# Patient Record
Sex: MALE | Race: WHITE
[De-identification: names, ages, dates, MRNs, and addresses within clinical notes are randomized per-mention and may not be internally consistent; named-entity substitution may affect disease eponyms.]

---

## 2017-01-20 ENCOUNTER — HOSPITAL ENCOUNTER (OUTPATIENT)
Dept: HOSPITAL 45 - C.LAB1850 | Age: 52
Discharge: HOME | End: 2017-01-20
Attending: INTERNAL MEDICINE
Payer: COMMERCIAL

## 2017-01-20 DIAGNOSIS — E03.9: ICD-10-CM

## 2017-01-20 DIAGNOSIS — Z94.0: ICD-10-CM

## 2017-01-20 DIAGNOSIS — Q87.81: Primary | ICD-10-CM

## 2017-01-20 LAB
ANION GAP SERPL CALC-SCNC: 10 MMOL/L (ref 3–11)
BASOPHILS # BLD: 0.03 K/UL (ref 0–0.2)
BASOPHILS NFR BLD: 0.6 %
BUN SERPL-MCNC: 15 MG/DL (ref 7–18)
BUN/CREAT SERPL: 15.3 (ref 10–20)
CALCIUM SERPL-MCNC: 10.5 MG/DL (ref 8.5–10.1)
CHLORIDE SERPL-SCNC: 97 MMOL/L (ref 98–107)
CO2 SERPL-SCNC: 28 MMOL/L (ref 21–32)
COMPLETE: YES
CREAT SERPL-MCNC: 0.96 MG/DL (ref 0.6–1.4)
EOSINOPHIL NFR BLD AUTO: 210 K/UL (ref 130–400)
EST. AVERAGE GLUCOSE BLD GHB EST-MCNC: 88 MG/DL
GLUCOSE SERPL-MCNC: 97 MG/DL (ref 70–99)
HCT VFR BLD CALC: 42.5 % (ref 42–52)
IG%: 0.2 %
IMM GRANULOCYTES NFR BLD AUTO: 22.2 %
LYMPHOCYTES # BLD: 1.11 K/UL (ref 1.2–3.4)
MCH RBC QN AUTO: 32 PG (ref 25–34)
MCHC RBC AUTO-ENTMCNC: 36.9 G/DL (ref 32–36)
MCV RBC AUTO: 86.7 FL (ref 80–100)
MONOCYTES NFR BLD: 12.8 %
NEUTROPHILS # BLD AUTO: 8.6 %
NEUTROPHILS NFR BLD AUTO: 55.6 %
PMV BLD AUTO: 10.7 FL (ref 7.4–10.4)
POTASSIUM SERPL-SCNC: 3.6 MMOL/L (ref 3.5–5.1)
RBC # BLD AUTO: 4.9 M/UL (ref 4.7–6.1)
SODIUM SERPL-SCNC: 135 MMOL/L (ref 136–145)
WBC # BLD AUTO: 4.99 K/UL (ref 4.8–10.8)

## 2017-04-21 ENCOUNTER — HOSPITAL ENCOUNTER (OUTPATIENT)
Dept: HOSPITAL 45 - C.LAB1850 | Age: 52
Discharge: HOME | End: 2017-04-21
Attending: INTERNAL MEDICINE
Payer: COMMERCIAL

## 2017-04-21 DIAGNOSIS — Z79.899: ICD-10-CM

## 2017-04-21 DIAGNOSIS — Z94.0: ICD-10-CM

## 2017-04-21 DIAGNOSIS — R94.6: Primary | ICD-10-CM

## 2017-09-09 ENCOUNTER — HOSPITAL ENCOUNTER (INPATIENT)
Dept: HOSPITAL 45 - C.EDB | Age: 52
LOS: 4 days | Discharge: HOME | DRG: 377 | End: 2017-09-13
Attending: FAMILY MEDICINE | Admitting: HOSPITALIST
Payer: COMMERCIAL

## 2017-09-09 VITALS
HEART RATE: 74 BPM | OXYGEN SATURATION: 100 % | SYSTOLIC BLOOD PRESSURE: 152 MMHG | DIASTOLIC BLOOD PRESSURE: 81 MMHG | TEMPERATURE: 98.6 F

## 2017-09-09 VITALS
WEIGHT: 147.71 LBS | BODY MASS INDEX: 23.18 KG/M2 | HEIGHT: 67 IN | WEIGHT: 147.71 LBS | BODY MASS INDEX: 23.18 KG/M2 | HEIGHT: 67 IN

## 2017-09-09 VITALS
DIASTOLIC BLOOD PRESSURE: 79 MMHG | TEMPERATURE: 98.78 F | HEART RATE: 77 BPM | SYSTOLIC BLOOD PRESSURE: 129 MMHG | OXYGEN SATURATION: 100 %

## 2017-09-09 VITALS
HEART RATE: 74 BPM | DIASTOLIC BLOOD PRESSURE: 81 MMHG | SYSTOLIC BLOOD PRESSURE: 152 MMHG | TEMPERATURE: 98.6 F | OXYGEN SATURATION: 100 %

## 2017-09-09 VITALS
DIASTOLIC BLOOD PRESSURE: 79 MMHG | OXYGEN SATURATION: 100 % | SYSTOLIC BLOOD PRESSURE: 128 MMHG | HEART RATE: 82 BPM | TEMPERATURE: 98.42 F

## 2017-09-09 VITALS
OXYGEN SATURATION: 97 % | DIASTOLIC BLOOD PRESSURE: 82 MMHG | SYSTOLIC BLOOD PRESSURE: 146 MMHG | HEART RATE: 93 BPM | TEMPERATURE: 98.24 F

## 2017-09-09 VITALS
SYSTOLIC BLOOD PRESSURE: 121 MMHG | TEMPERATURE: 96.44 F | OXYGEN SATURATION: 99 % | DIASTOLIC BLOOD PRESSURE: 76 MMHG | HEART RATE: 78 BPM

## 2017-09-09 VITALS
OXYGEN SATURATION: 100 % | SYSTOLIC BLOOD PRESSURE: 126 MMHG | DIASTOLIC BLOOD PRESSURE: 71 MMHG | TEMPERATURE: 98.78 F | HEART RATE: 82 BPM

## 2017-09-09 DIAGNOSIS — N18.6: ICD-10-CM

## 2017-09-09 DIAGNOSIS — K22.70: ICD-10-CM

## 2017-09-09 DIAGNOSIS — Z94.0: ICD-10-CM

## 2017-09-09 DIAGNOSIS — T39.015A: ICD-10-CM

## 2017-09-09 DIAGNOSIS — E87.6: ICD-10-CM

## 2017-09-09 DIAGNOSIS — I24.8: ICD-10-CM

## 2017-09-09 DIAGNOSIS — E03.9: ICD-10-CM

## 2017-09-09 DIAGNOSIS — K57.30: ICD-10-CM

## 2017-09-09 DIAGNOSIS — I12.0: ICD-10-CM

## 2017-09-09 DIAGNOSIS — D62: ICD-10-CM

## 2017-09-09 DIAGNOSIS — E83.42: ICD-10-CM

## 2017-09-09 DIAGNOSIS — Z87.891: ICD-10-CM

## 2017-09-09 DIAGNOSIS — F32.9: ICD-10-CM

## 2017-09-09 DIAGNOSIS — M1A.9XX0: ICD-10-CM

## 2017-09-09 DIAGNOSIS — Z88.2: ICD-10-CM

## 2017-09-09 DIAGNOSIS — Q87.81: ICD-10-CM

## 2017-09-09 DIAGNOSIS — K92.2: Primary | ICD-10-CM
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
## 2017-09-09 LAB
ALP SERPL-CCNC: 53 U/L (ref 45–117)
ALT SERPL-CCNC: 30 U/L (ref 12–78)
ANION GAP SERPL CALC-SCNC: 12 MMOL/L (ref 3–11)
AST SERPL-CCNC: 25 U/L (ref 15–37)
BASOPHILS # BLD: 0.04 K/UL (ref 0–0.2)
BASOPHILS NFR BLD: 0.3 %
BUN SERPL-MCNC: 33 MG/DL (ref 7–18)
BUN/CREAT SERPL: 30.3 (ref 10–20)
CALCIUM SERPL-MCNC: 10.2 MG/DL (ref 8.5–10.1)
CHLORIDE SERPL-SCNC: 99 MMOL/L (ref 98–107)
CKMB/CK RATIO: 1 (ref 0–3)
CO2 SERPL-SCNC: 25 MMOL/L (ref 21–32)
COMPLETE: YES
CREAT CL PREDICTED SERPL C-G-VRATE: 72.6 ML/MIN
CREAT SERPL-MCNC: 1.1 MG/DL (ref 0.6–1.4)
EOSINOPHIL NFR BLD AUTO: 297 K/UL (ref 130–400)
GLUCOSE SERPL-MCNC: 127 MG/DL (ref 70–99)
HCT VFR BLD CALC: 21.3 % (ref 42–52)
IG%: 0.5 %
IMM GRANULOCYTES NFR BLD AUTO: 9.2 %
INR PPP: 1 (ref 0.9–1.1)
LYMPHOCYTES # BLD: 1.21 K/UL (ref 1.2–3.4)
MAGNESIUM SERPL-MCNC: 1.3 MG/DL (ref 1.8–2.4)
MCH RBC QN AUTO: 32.4 PG (ref 25–34)
MCHC RBC AUTO-ENTMCNC: 36.2 G/DL (ref 32–36)
MCV RBC AUTO: 89.5 FL (ref 80–100)
MONOCYTES NFR BLD: 7.1 %
NEUTROPHILS # BLD AUTO: 0.7 %
NEUTROPHILS NFR BLD AUTO: 82.2 %
NRBC BLD AUTO-RTO: 0.7 %
PARTIAL THROMBOPLASTIN RATIO: 0.8
PMV BLD AUTO: 10.7 FL (ref 7.4–10.4)
POLYCHROMASIA BLD QL SMEAR: (no result)
POTASSIUM SERPL-SCNC: 2.2 MMOL/L (ref 3.5–5.1)
PROTHROMBIN TIME: 11.1 SECONDS (ref 9–12)
RBC # BLD AUTO: 2.38 M/UL (ref 4.7–6.1)
SODIUM SERPL-SCNC: 136 MMOL/L (ref 136–145)
TIBC SERPL-MCNC: 349 MCG/DL (ref 250–450)
WBC # BLD AUTO: 13.17 K/UL (ref 4.8–10.8)

## 2017-09-09 RX ADMIN — POTASSIUM CHLORIDE SCH MLS/HR: 10 INJECTION, SOLUTION INTRAVENOUS at 20:00

## 2017-09-09 RX ADMIN — POTASSIUM CHLORIDE SCH MLS/HR: 10 INJECTION, SOLUTION INTRAVENOUS at 17:00

## 2017-09-09 RX ADMIN — IPRATROPIUM BROMIDE AND ALBUTEROL SULFATE SCH ML: .5; 3 SOLUTION RESPIRATORY (INHALATION) at 16:00

## 2017-09-09 RX ADMIN — METOPROLOL TARTRATE SCH MG: 5 INJECTION, SOLUTION INTRAVENOUS at 18:04

## 2017-09-09 RX ADMIN — DEXTROSE MONOHYDRATE, SODIUM CHLORIDE, AND POTASSIUM CHLORIDE SCH MLS/HR: 50; 9; 1.49 INJECTION, SOLUTION INTRAVENOUS at 18:03

## 2017-09-09 RX ADMIN — IPRATROPIUM BROMIDE AND ALBUTEROL SULFATE SCH ML: .5; 3 SOLUTION RESPIRATORY (INHALATION) at 20:00

## 2017-09-09 RX ADMIN — CLONIDINE HYDROCHLORIDE SCH MG: 0.1 TABLET ORAL at 20:20

## 2017-09-09 RX ADMIN — MYCOPHENOLIC ACID SCH MG: 180 TABLET, DELAYED RELEASE ORAL at 20:20

## 2017-09-09 RX ADMIN — PANTOPRAZOLE SCH MG: 40 TABLET, DELAYED RELEASE ORAL at 20:21

## 2017-09-09 RX ADMIN — Medication SCH MG: at 20:20

## 2017-09-09 RX ADMIN — TACROLIMUS SCH MG: 0.5 CAPSULE ORAL at 20:20

## 2017-09-09 RX ADMIN — POTASSIUM CHLORIDE SCH MLS/HR: 10 INJECTION, SOLUTION INTRAVENOUS at 20:19

## 2017-09-09 RX ADMIN — METHYLPREDNISOLONE SODIUM SUCCINATE SCH MLS/MIN: 1 INJECTION, POWDER, FOR SOLUTION INTRAMUSCULAR; INTRAVENOUS at 20:21

## 2017-09-09 NOTE — DIAGNOSTIC IMAGING REPORT
CHEST ONE VIEW PORTABLE



CLINICAL HISTORY: SOB dyspnea



COMPARISON STUDY:  No previous studies for comparison.



FINDINGS: The bones soft tissues and hemidiaphragms are normal. The

cardiomediastinal silhouette is normal. The lungs are clear. The pulmonary

vasculature is normal. 



IMPRESSION:  Negative chest. 











The above report was generated using voice recognition software.  It may contain

grammatical, syntax or spelling errors.









Electronically signed by:  Zelalem Bowers M.D.

9/9/2017 12:45 PM



Dictated Date/Time:  9/9/2017 12:45 PM

## 2017-09-09 NOTE — EMERGENCY ROOM VISIT NOTE
History


Report prepared by Nahomy:  Natasha Grissom


Under the Supervision of:  Dr. Maryann Gupta M.D.


First contact with patient:  12:14


Chief Complaint:  SHORTNESS OF BREATH


Stated Complaint:  CHILLS,COUGH,WHEEZING,DIZZY,SOB


Nursing Triage Summary:  


patient states he has had a cough, sob, not feeling well for the past week. I 


was placed on doxycycline on tuesday





History of Present Illness


The patient is a 52 year old male who presents to the Emergency Room with 

complaints of constant shortness of breath beginning last week. The patient 

notes that he has a cough, vomiting, dizziness, and darkened stool. The patient 

saw his PCP 5 days ago and was put on doxycycline. He notes that he continued 

to feel ill after starting the antibiotic and was not able to go to work. The 

patient has a history of kidney transplant and gout. The patient is not 

currently on dialysis.





   Source of History:  patient


   Onset:  last week


   Timing:  constant


   Associated Symptoms:  + cough, + vomiting


Note:


Pt notes dizziness and darkened stool.





Review of Systems


See HPI for pertinent positives & negatives. A total of 10 systems reviewed and 

were otherwise negative.





Past Medical & Surgical


Medical Problems:


(1) Symptomatic anemia








Social History


Smoking Status:  Former Smoker


Marital Status:  


Housing Status:  lives with family


Occupation Status:  employed





Current/Historical Medications


Scheduled


Allopurinol (Allopurinol), 300 MG PO DAILY


Amiloride Hcl (Amiloride Hcl), 5 MG PO DAILY


Amlodipine (Norvasc), 5 MG PO DAILY


Aspirin (Aspirin Ec), 81 MG PO DAILY


Clonidine HCl (Clonidine HCl), 0.2 MG PO BID


Doxycycline (Monohydrate) (Doxycycline), 100 MG PO BID


Fish Oil (Omega-3), 1 CAP PO BID


Guaifenesin Ext Rel (Mucinex Ext Rel), 1,200 MG PO Q12


Hydralazine Hcl (Apresoline), 10 MG PO TID


Liothyronine Sodium (Liothyronine Sodium), 25 MCG PO DAILY


Magnesium Oxide (Mg Supplement (Magnesium), 500 MG PO BID


Metolazone (Zaroxolyn), 2.5 MG PO DAILY


Metoprolol Tartrate (Lopressor) (Lopressor), 100 MG PO BID


Multivitamin (Multivitamin), 1 TAB PO DAILY


Mycophenolate Sodium (Myfortic), 360 MG PO BID


Sertraline (Zoloft), 200 MG PO DAILY


Tacrolimus (Prograf), 0.5 MG PO QPM


Tacrolimus (Prograf), 1 MG PO QAM





Allergies


Coded Allergies:  


     Sulfa Drugs (Verified  Allergy, Mild, RASH, 9/9/17)





Physical Exam


Vital Signs











  Date Time  Temp Pulse Resp B/P (MAP) Pulse Ox O2 Delivery O2 Flow Rate FiO2


 


9/9/17 14:00  76 24 104/74 100   


 


9/9/17 13:00  84 24 148/78 97   


 


9/9/17 12:53  75      


 


9/9/17 12:16     98 Room Air  


 


9/9/17 12:01 36.8 100 18 112/73 97 Room Air  











Physical Exam


Vital signs reviewed.





General: Chronically ill-appearing male, in no significant distress. On nasal 

cannula oxygen. 





HEENT: No scleral icterus, PERRLA, neck supple.  Atraumatic.





Cardiovascular: Regular rate and rhythm, no extra sounds.





Pulmonary: Clear to auscultation bilaterally, normal work of breathing.





Abdomen: Soft, nontender, nondistended, positive bowel sounds.





Musculoskeletal: Atraumatic, no peripheral edema. Fistula to right upper 

extremity.





Neurologic: Patient awake alert and oriented x 3





Skin: Warm, dry, no rash





Rectal: Deferred per patient's request to nursing staff.  Guaiac positive stool.





Medical Decision & Procedures


ER Provider


Diagnostic Interpretation:


Radiology results as stated below per my review and radiologist interpretation:





CHEST ONE VIEW PORTABLE








FINDINGS: The bones soft tissues and hemidiaphragms are normal. The


cardiomediastinal silhouette is normal. The lungs are clear. The pulmonary


vasculature is normal. 





IMPRESSION:  Negative chest. 


The above report was generated using voice recognition software.  It may contain


grammatical, syntax or spelling errors.


Electronically signed by:  Zelalem Bowers M.D.





Laboratory Results


9/9/17 12:20








Red Blood Count 2.38, Mean Corpuscular Volume 89.5, Mean Corpuscular Hemoglobin 

32.4, Mean Corpuscular Hemoglobin Concent 36.2, Mean Platelet Volume 10.7, 

Neutrophils (%) (Auto) 82.2, Lymphocytes (%) (Auto) 9.2, Monocytes (%) (Auto) 

7.1, Eosinophils (%) (Auto) 0.7, Basophils (%) (Auto) 0.3, Neutrophils # (Auto) 

10.83, Lymphocytes # (Auto) 1.21, Monocytes # (Auto) 0.93, Eosinophils # (Auto) 

0.09, Basophils # (Auto) 0.04





9/9/17 12:20

















Test


  9/9/17


12:20 9/9/17


12:47


 


White Blood Count


  13.17 K/uL


(4.8-10.8) 


 


 


Red Blood Count


  2.38 M/uL


(4.7-6.1) 


 


 


Hemoglobin


  7.7 g/dL


(14.0-18.0) 


 


 


Hematocrit 21.3 % (42-52)  


 


Mean Corpuscular Volume


  89.5 fL


() 


 


 


Mean Corpuscular Hemoglobin


  32.4 pg


(25-34) 


 


 


Mean Corpuscular Hemoglobin


Concent 36.2 g/dl


(32-36) 


 


 


Platelet Count


  297 K/uL


(130-400) 


 


 


Mean Platelet Volume


  10.7 fL


(7.4-10.4) 


 


 


Neutrophils (%) (Auto) 82.2 %  


 


Lymphocytes (%) (Auto) 9.2 %  


 


Monocytes (%) (Auto) 7.1 %  


 


Eosinophils (%) (Auto) 0.7 %  


 


Basophils (%) (Auto) 0.3 %  


 


Neutrophils # (Auto)


  10.83 K/uL


(1.4-6.5) 


 


 


Lymphocytes # (Auto)


  1.21 K/uL


(1.2-3.4) 


 


 


Monocytes # (Auto)


  0.93 K/uL


(0.11-0.59) 


 


 


Eosinophils # (Auto)


  0.09 K/uL


(0-0.5) 


 


 


Basophils # (Auto)


  0.04 K/uL


(0-0.2) 


 


 


RDW Standard Deviation


  44.9 fL


(36.4-46.3) 


 


 


RDW Coefficient of Variation


  16.9 %


(11.5-14.5) 


 


 


Immature Granulocyte % (Auto) 0.5 %  


 


Immature Granulocyte # (Auto)


  0.07 K/uL


(0.00-0.02) 


 


 


Nucleated RBC Absolute Count


(auto) 0.09 K/uL


(0-0) 


 


 


Nucleated Red Blood Cells % 0.7 %  


 


Polychromasia 1+  


 


Anion Gap


  12.0 mmol/L


(3-11) 


 


 


Est Creatinine Clear Calc


Drug Dose 72.6 ml/min 


  


 


 


Estimated GFR (


American) 88.4 


  


 


 


Estimated GFR (Non-


American 76.2 


  


 


 


BUN/Creatinine Ratio 30.3 (10-20)  


 


Calcium Level


  10.2 mg/dl


(8.5-10.1) 


 


 


Magnesium Level


  1.3 mg/dl


(1.8-2.4) 


 


 


Iron Level


  61 mcg/dl


() 


 


 


Total Iron Binding Capacity


  349 mcg/dl


(250-450) 


 


 


Total Bilirubin


  0.6 mg/dl


(0.2-1) 


 


 


Direct Bilirubin


  0.1 mg/dl


(0-0.2) 


 


 


Aspartate Amino Transf


(AST/SGOT) 25 U/L (15-37) 


  


 


 


Alanine Aminotransferase


(ALT/SGPT) 30 U/L (12-78) 


  


 


 


Alkaline Phosphatase


  53 U/L


() 


 


 


Total Creatine Kinase


  147 U/L


() 


 


 


Creatine Kinase MB


  1.5 ng/ml


(0.5-3.6) 


 


 


Creatine Kinase MB Ratio 1.0 (0-3.0)  


 


Total Protein


  6.8 gm/dl


(6.4-8.2) 


 


 


Albumin


  3.4 gm/dl


(3.4-5.0) 


 


 


Bedside Troponin I


  


  < 0.030 ng/ml


(0-0.045)





Laboratory results per my review.





Medications Administered











 Medications


  (Trade)  Dose


 Ordered  Sig/Juanita


 Route  Start Time


 Stop Time Status Last Admin


Dose Admin


 


 Albuterol/


 Ipratropium


  (Duoneb)  3 ml  NOW  STAT


 INH  9/9/17 12:18


 9/9/17 12:24 DC 9/9/17 12:35


3 ML


 


 Methylprednisolone


 Sodium Succinate


  (Solu-Medrol IV)  125 mg  NOW  STAT


 IV  9/9/17 12:34


 9/9/17 12:35 DC 9/9/17 12:36


125 MG


 


 Potassium Chloride


  (Kcl 10 Meq /


 Wtr)  20 meq  NOW  STAT


 IV  9/9/17 13:14


 9/9/17 13:15 DC 9/9/17 13:52


20 MEQ


 


 Magnesium Sulfate


  (Magnesium


 Sulfate)  2 gm  NOW  STAT


 IV  9/9/17 13:14


 9/9/17 13:15 DC 9/9/17 13:52


2 GM











ECG


Indication:  SOB/dyspnea


Rate (beats per minute):  81


Rhythm:  normal sinus


Findings:  no acute ischemic change, no ectopy, other (poor quality baseline 

for interpretation)





ED Course


1217: Past medical records reviewed. The patient was evaluated in room A3. A 

complete history and physical examination was performed. 





1218: Duoneb 3 ml INH.





1234: Solu-Medrol  mg IV.





1314: Magnesium Sulfate 2 gm IV, Potassium Chloride 20 meq IV.





1350: I talked to the  about admissions. 





1424: I reviewed the patient's case with .  He will evaluate the 

patient for further management.





Medical Decision





Differential diagnosis:


Etiologies such as metabolic, infection, hypo/hyperglycemia, electrolyte 

abnormalities, cardiac sources, intracerebral event, toxicologic, neurologic, 

as well as others were entertained. 





This patient was evaluated and appeared to be in no significant distress.  IV 

access was obtained and laboratory work was drawn.  The patient was placed on 

the cardiac monitor and found to be in a normal sinus rhythm.  EKG reveals some 

nonspecific T-wave abnormalities.  Chest x-ray was obtained and is largely 

clear.  He was given a DuoNeb treatment for his wheezing.  IV Solu-Medrol was 

administered.  The patient did have resolution of his shortness of breath.  

Laboratory work reveals H&H of 7.7/21.  Patient also has potassium of 2.2 and a 

magnesium of 1.3.  Stool is guaiac positive per nursing staff.  Patient was 

typed and crossed for 1 unit of PRBCs.  He was consented for transfusion.  20 

and make use of IV potassium and 2 g of IV magnesium were ordered.  The patient 

has no significant complaints at this time.  Patient was discussed with the 

hospitalist service for admission and further management.





Medication Reconcilliation


Current Medication List:  was personally reviewed by me





Blood Pressure Screening


Patient's blood pressure:  Normal blood pressure





Consults


Time Called:  1420


Consulting Physician:  Dr. Marco STUART


Returned Call:  1424


Discussed the patient's case. He will be evaluated for further management.





Impression





 Primary Impression:  


 GI bleed


 Additional Impressions:  


 Hypokalemia


 Hypomagnesemia


 Anemia





Scribe Attestation


The scribe's documentation has been prepared under my direction and personally 

reviewed by me in its entirety. I confirm that the note above accurately 

reflects all work, treatment, procedures, and medical decision making performed 

by me.





Departure Information


Dispostion


Being Evaluated By Hospitalist





Referrals


Gray Monahan M.D. (PCP)





Patient Instructions


My [facility]





Problem Qualifiers

## 2017-09-09 NOTE — DIAGNOSTIC IMAGING REPORT
(CHEST) THORAX WITHOUT



CT DOSE: 273.63 mGy.cm



HISTORY: Dyspnea  shortness of breath



TECHNIQUE: Multiaxial CT images of the chest were performed without contrast.  A

dose lowering technique was utilized adhering to the principles of ALARA.





COMPARISON: None.



FINDINGS: The lungs are clear. The mediastinal vascular structures are within

normal limits. No mediastinal or hilar lymphadenopathy. No pleural effusion or

pneumothorax. Limited views of the upper abdomen demonstrate a normal liver and

spleen. Kidneys are heavily calcified



IMPRESSION: 

No acute process. 









The above report was generated using voice recognition software.  It may contain

grammatical, syntax or spelling errors.







Electronically signed by:  Zelalem Bowers M.D.

9/9/2017 4:05 PM



Dictated Date/Time:  9/9/2017 4:03 PM

## 2017-09-09 NOTE — HISTORY AND PHYSICAL
History & Physical


Date & Time of Service:


Sep 9, 2017 at 15:07


Chief Complaint:


Chills,Cough,Wheezing,Dizzy,Sob


Primary Care Physician:


Gray Monahan M.D.


History of Present Illness


Source:  patient, spouse, clinic records, hospital records


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 

renal transplant x 3, HTN, hypothyroidism, depression and gout who presented to 

the ED on 9/9 with shortness of breath, non-productive cough, dizziness and 

melena.  The patient states that his symptoms first began with a non-productive 

cough about one week ago.  He developed associated shortness of breath with 

severe coughing fits and at times vomiting after coughing.  Over the last week 

his shortness of breath has become progressive and accompanied by dyspnea on 

minimal exertion and wheezing. The patient saw his PCP on September 5th and was 

prescribed doxycycline for a presumed respiratory infection.  The patient has 

not felt any better since starting the antibiotic.  Around that time, the 

patient also started to note melena.  He denies any rajiv blood.  This morning 

PTA the patient developed some chills.  He complains of generalized weakness 

and fatigue.  He has some dizziness associated with exertion and severe 

coughing fits.  The patient denies fevers, sweats, chest pain, palpitations, 

claudication, nausea, abdominal pain, dysuria, hematuria, urinary retention, 

paralysis, motor weakness, numbness and tingling.





Past Medical/Surgical History


Alport syndrome





ESRD s/p renal transplant x 3.  Currently 1 working transplant kidney right side





HTN





Hypothyroidism





Depression





Gout





Family History


No known family medical history.  Patient is adopted.





Social History


Smoking Status:  Current Some Day Smoker


Smokeless Tobacco Use:  No


Alcohol Use:  none


Drug Use:  none


Marital Status:  


Housing status:  lives with significant other


Occupational Status:  employed





Multi-Drug Resistant Organisms


History of MDRO:  No





Allergies


Coded Allergies:  


     Sulfa Drugs (Verified  Allergy, Mild, RASH, 9/9/17)





Home Medications


Scheduled


Allopurinol (Allopurinol), 300 MG PO DAILY


Amiloride Hcl (Amiloride Hcl), 5 MG PO DAILY


Amlodipine (Norvasc), 5 MG PO DAILY


Aspirin (Aspirin Ec), 81 MG PO DAILY


Clonidine HCl (Clonidine HCl), 0.2 MG PO BID


Doxycycline (Monohydrate) (Doxycycline), 100 MG PO BID


Fish Oil (Omega-3), 1 CAP PO BID


Guaifenesin Ext Rel (Mucinex Ext Rel), 1,200 MG PO Q12


Hydralazine Hcl (Apresoline), 10 MG PO TID


Liothyronine Sodium (Liothyronine Sodium), 25 MCG PO DAILY


Magnesium Oxide (Mg Supplement (Magnesium), 500 MG PO BID


Metolazone (Zaroxolyn), 2.5 MG PO DAILY


Metoprolol Tartrate (Lopressor) (Lopressor), 100 MG PO BID


Multivitamin (Multivitamin), 1 TAB PO DAILY


Mycophenolate Sodium (Myfortic), 360 MG PO BID


Sertraline (Zoloft), 200 MG PO DAILY


Tacrolimus (Prograf), 0.5 MG PO QPM


Tacrolimus (Prograf), 1 MG PO QAM





Review of Systems


Constitutional:  + chills, + sweats, + weakness, + fatigue, No fever


Eyes:  No worsening of vision, No eye pain, No diplopia


ENT:  No hearing loss, No sore throat, No trouble swallowing


Respiratory:  + cough, + wheezing, + shortness of breath, + dyspnea on exertion

, No sputum


Cardiovascular:  No chest pain, No claudication, No palpitations


Abdomen:  + vomiting (posttussive), + GI bleeding (melena), No pain, No nausea


Musculoskeletal:  No joint pain, No muscle pain, No calf pain


Genitourinary - Male:  No hematuria, No dysuria, No urinary retention


Neurologic:  No paralysis, No weakness, No numbness/tingling


Integumentary:  No rash, No itch, No color change





Physical Exam


Vital Signs











  Date Time  Temp Pulse Resp B/P (MAP) Pulse Ox O2 Delivery O2 Flow Rate FiO2


 


9/9/17 14:00  76 24 104/74 100   


 


9/9/17 13:00  84 24 148/78 97   


 


9/9/17 12:53  75      


 


9/9/17 12:16     98 Room Air  


 


9/9/17 12:01 36.8 100 18 112/73 97 Room Air  








General appearance:  +Appears chronically ill.  Well-developed, well-nourished, 

no apparent distress


Head:  Normocephalic, atraumatic


Eyes:  Normal inspection, PERRL, EOMI


ENT:  Normal ENT inspection, hearing grossly normal, pharynx normal


Neck:  Supple, no JVD, trachea midline


Respiratory/Chest:  +Wheezing in bases, diminished breath sounds throughout.  

No respiratory distress


Cardiovascular:  +Systolic murmur.  Regular rate & rhythm, no gallop


Abdomen/GI:  Normal bowel sounds, non-tender, soft


Extremities/Musculoskeletal:  Normal inspection, no calf tenderness, no pedal 

edema


Neurological/Psych:  Alert, normal mood/affect, oriented x 3


Skin:  Normal color, warm/dry, no rash





Diagnostics


Laboratory Results





Results Past 24 Hours








Test


  9/9/17


12:20 9/9/17


12:47 Range/Units


 


 


White Blood Count 13.17  4.8-10.8  K/uL


 


Red Blood Count 2.38  4.7-6.1  M/uL


 


Hemoglobin 7.7  14.0-18.0  g/dL


 


Hematocrit 21.3  42-52  %


 


Mean Corpuscular Volume 89.5    fL


 


Mean Corpuscular Hemoglobin 32.4  25-34  pg


 


Mean Corpuscular Hemoglobin


Concent 36.2


  


  32-36  g/dl


 


 


Platelet Count 297  130-400  K/uL


 


Mean Platelet Volume 10.7  7.4-10.4  fL


 


Neutrophils (%) (Auto) 82.2   %


 


Lymphocytes (%) (Auto) 9.2   %


 


Monocytes (%) (Auto) 7.1   %


 


Eosinophils (%) (Auto) 0.7   %


 


Basophils (%) (Auto) 0.3   %


 


Neutrophils # (Auto) 10.83  1.4-6.5  K/uL


 


Lymphocytes # (Auto) 1.21  1.2-3.4  K/uL


 


Monocytes # (Auto) 0.93  0.11-0.59  K/uL


 


Eosinophils # (Auto) 0.09  0-0.5  K/uL


 


Basophils # (Auto) 0.04  0-0.2  K/uL


 


RDW Standard Deviation 44.9  36.4-46.3  fL


 


RDW Coefficient of Variation 16.9  11.5-14.5  %


 


Immature Granulocyte % (Auto) 0.5   %


 


Immature Granulocyte # (Auto) 0.07  0.00-0.02  K/uL


 


Nucleated RBC Absolute Count


(auto) 0.09


  


  0-0  K/uL


 


 


Nucleated Red Blood Cells % 0.7   %


 


Polychromasia 1+   


 


Sodium Level 136  136-145  mmol/L


 


Potassium Level 2.2  3.5-5.1  mmol/L


 


Chloride Level 99    mmol/L


 


Carbon Dioxide Level 25  21-32  mmol/L


 


Anion Gap 12.0  3-11  mmol/L


 


Blood Urea Nitrogen 33  7-18  mg/dl


 


Creatinine


  1.10


  


  0.60-1.40


mg/dl


 


Est Creatinine Clear Calc


Drug Dose 72.6


  


   ml/min


 


 


Estimated GFR (


American) 88.4


  


   


 


 


Estimated GFR (Non-


American 76.2


  


   


 


 


BUN/Creatinine Ratio 30.3  10-20  


 


Random Glucose 127  70-99  mg/dl


 


Calcium Level 10.2  8.5-10.1  mg/dl


 


Magnesium Level 1.3  1.8-2.4  mg/dl


 


Total Bilirubin 0.6  0.2-1  mg/dl


 


Direct Bilirubin 0.1  0-0.2  mg/dl


 


Aspartate Amino Transf


(AST/SGOT) 25


  


  15-37  U/L


 


 


Alanine Aminotransferase


(ALT/SGPT) 30


  


  12-78  U/L


 


 


Alkaline Phosphatase 53    U/L


 


Total Creatine Kinase 147    U/L


 


Creatine Kinase MB 1.5  0.5-3.6  ng/ml


 


Creatine Kinase MB Ratio 1.0  0-3.0  


 


Total Protein 6.8  6.4-8.2  gm/dl


 


Albumin 3.4  3.4-5.0  gm/dl


 


Bedside Troponin I  < 0.030 0-0.045  ng/ml











Diagnostic Radiology


Reviewed the following studies and agree with interpretation as follows:





                                                                               

                                                                 


Patient Name: MARY LOPEZ                               Unit Number:  

[id number]                  


 





 











Dictated: 09/09/17 1245


 


Transcribed: 09/09/17 1245


 


MS


 


Printed Date/Time: [~ rep prt dt]/[~ rep prt tm]








 [~ rep ct labl] - [~ rep ct ivnm]


 





   [facility]


 Radiology Department


 [place] [address] [phone]





 











Dictated: 09/09/17 1245


 


Transcribed: 09/09/17 1245


 


MS


 


Printed Date/Time: [~ rep prt dt]/[~ rep prt tm]








 [~ rep ct labl] - [~ rep ct ivnm]


 





 











Patient:  MARY LOPEZ Address1:  [address] [facility] Rec:  [id number] Address2:  


 


Acct ID:  [id number] [facility] Zip:  [place] [address]


 


YOB: 1965          Sex:  M Room/Bed:  


 


Ref Phy:  Gray Monahan M.D. SC: LETI


 


Att Phy:   Report #:  [id number]


 


Safia Phy:  Gray Monahan M.D. Test:  CXR1P


 


Admit Phy:   Technician:  PAYAM


 


Interpreting Phy:  Zelalem Bowers M.D. Diagnosis:  CHILLS,COUGH,WHEEZING,DIZZY,

SOB


 


Ordering Phy:  Maryann Gupta M.D. Service Date:  09/09/17


 


Admit Date: 09/09/17 MNE: [id number]


 


 CONF:


 


 DICTATED BY: Zelalem Bowers M.D.]]


 


CC: Gray Monahan M.D. Flickinger, Bridget B., M.D.


 


 Endcc:











[~ rep ct add3]]


CHEST ONE VIEW PORTABLE





CLINICAL HISTORY: SOB dyspnea





COMPARISON STUDY:  No previous studies for comparison.





FINDINGS: The bones soft tissues and hemidiaphragms are normal. The


cardiomediastinal silhouette is normal. The lungs are clear. The pulmonary


vasculature is normal. 





IMPRESSION:  Negative chest. 

















The above report was generated using voice recognition software.  It may contain


grammatical, syntax or spelling errors.














Electronically signed by:  Zelalem Bowers M.D.


9/9/2017 12:45 PM





Dictated Date/Time:  9/9/2017 12:45 PM





 The status of this report is Signed.   


 Draft = Not yet reviewed or approved by Radiologist.  


 Signed = Reviewed and approved by Radiologist.


<AttendingPhy></AttendingPhy> <FamilyPhy>Gray Monahan M.D.</FamilyPhy> <

PrimaryPhy>Gray Monahan M.D.</PrimaryPhy> <UnitNumber>[id number]</UnitNumber

> <VisitNumber>[id number]</VisitNumber> <PatientName>MARY LOPEZ</

PatientName> <DateOfBirth>1965</DateOfBirth> <Location>[facility]</Location> <

ServiceDate>09/09/17</ServiceDate> <MNE>ESINDI</MNE> <OrderingPhy>Maryann Gupta M.D.</OrderingPhy> <OrderingPhyMNE>f rep ord dr mne</OrderingPhyMNE> <

DictatingPhyMNE>f rep dict dr webb</DictatingPhyMNE> <CCListMNE>f rep ct jon</

CCListMNE> <AdmittingPhyMNE>f pt admit dr webb</AdmittingPhyMNE> <AttendingPhyMNE

>f pt attend dr webb</AttendingPhyMNE>


<ConsultingPhyMNE>f pt consult dr webb</ConsultingPhyMNE> <FamilyPhyMNE>f pt fam 

dr webb</FamilyPhyMNE> <OtherPhyMNE>f pt other dr webb</OtherPhyMNE> <

PrimaryPhyMNE>f pt prim care dr webb</PrimaryPhyMNE> <ReferringPhyMNE>f pt 

referring dr webb</ReferringPhyMNE>





EKG


Reviewed EKG and agree with interpretation as follows:





81 bpm, NSR





Impression


Assessment and Plan


51 y/o male with a history of ESRD secondary to Alport syndrome s/p renal 

transplant x 3, HTN, hypothyroidism, depression and gout who presented to the 

ED on 9/9 with shortness of breath, non-productive cough, dizziness and melena.

  Patient afebrile, VSS on arrival.  CXR negative for acute disease.  WBC 

elevated 13.17.  Hgb 7.7.  Potassium 2.2.  Magnesium 1.3.  Cardiac enzymes 

negative.  EKG no ischemic changes.  





Acute blood loss anemia secondary to GI bleed--Hgb 7.7 on admission, baseline 15


-Admit to telemetry


-1 unit PRBC ordered to transfuse per ED


-Check H&h q4


-Iron and TIBC pending


-GI consulted, appreciate recs


-Protonix drip


-NPO except meds.  Home meds converted to IV where able


-Hold aspirin and fish oil


-D5NSS + 20 mEq KCl at 100 cc/hr.  Hold during transfusion unless hypotensive





?Bronchitis vs COPD exacerbation--pt denies h/o COPD, no history noted in 

outpatient records or h/o inhalers.  CXR per my interpretation appears 

emphysematous


-CT chest w/o contrast for further evaluation


-Pt completed 5 days doxycycline outpatient, will hold off further abx for now


-DuoNebs QIDR and q2h prn SOB/wheezing


-Solu-Medrol 40 mg IV TID.  Pt received 125 mg loading dose in ED





Hypokalemia, hypomagnesemia--h/o hypomagnesemia.  Upon review of lab history, 

magnesium typically around 1.5


-Potassium 2.2 on admission, magnesium 1.3.  Given 20 mEQ KCl and 2 mg 

magnesium in ED


-KCl 20 mEq IV q2h x 3, continue to monitor





ESRD secondary to Alport syndrome, s/p renal transplant x 3--stable


-Baseline creatinine around 1, creatinine 1.1 on admission


-Continue Myfortic 360 mg PO BID and tacrolimus 1 mg PO qam and 0.5 mg PO qpm





HTN--stable


-Lopressor 5 mg IV q6h, hydralazine 5 mg IV q6h, clonidine 0.2 mg PO BID and 

Norvasc 5 mg PO qd


-Hold amiloride and metolazone for now


-Bumex 0.5 mg PO qd prn fluid overload





Hypothyroidism


-Synthroid 12.5 mcg IV qd





Depression


-Continue Zoloft 200 mg PO qd





Gout


-Allopurinol held





DVT prophylaxis


-Hold chemical prophylaxis due bleeding


-SCDs





Code Status


-Level I, FULL RESUSCITATION STATUS





51 y/o M Hx ESRD post renal transplant x 3 - Alports syndrome - pt presenting 

with weakness and SOB - Hb found to be 7.4 


Describes dark stool and GERD - has Hx of bleeding ulcer following surgery 


Had been taking Doxy x 5 days for cough and SOB prior to arrival - no 

improvement





OE


Pale, thin male - no distress


S1,2 R


wheezing present in most lung fields


NT, ND


No CCE





P:


Work for anemia - active bleed - GI consult - transfusions started in ER


We suspect element of COPD due to imaging results, smoking Hx  - treating with 

Nebs, steroids, 02 - CT chest to ro PNM pending due to his immune status


Cont Immune meds for transplant Hx 


HTN meds, synthroid converted to IV





Level of Care


Telemetry





Resuscitation Status


FULL RESUSCITATION





VTE Prophylaxis


VTE Risk Assessment Done? Y/N:  Yes


Risk Level:  Moderate


Given or contraindicated:  SCD's

## 2017-09-09 NOTE — GASTROINTESTINAL CONSULTATION
Gastrointestinal Consultation


Date of Consultation:  Sep 9, 2017


Attending Physician:  JASON Whiet


Consulting Physician:  Wilfredo Silvestre


Reason for Consultation:  GI bleeding


History of Present Illness


Friend Susana with patient for h and P.  Patient is a 52 year old male [facility] chief 

complaint  of weakness. He is on daily ASA to help thin blood but denies MI,CVA

, or stents. He is s/p renal transplant. Over the last week has had cough and 

SOB and Doxycline did not help. Also over the last week looser and black 

stools. No stools for 2 days, however. Also fatigue, weakness. Vomiting post 

coughing spells mostly but this am spontaneous vomiting. Never any blood or 

black in vomitus.  No abd pain. No dysphagia.  States he had EGD in 1986 and 

noted to have DU.  States 2 colonscopies in past most recent over 5 years ago. 

No change in weight.  Hgb on admit 7.7 versus 15.9 on 4/21/17. CMP K 2.2. CXR 

and Chest CT negative.





Past Medical/Surgical History


Medical Problems:


(1) Anemia


Status: Acute  





(2) GI bleed


Status: Acute  





(3) Hypokalemia


Status: Acute  





(4) Hypomagnesemia


Status: Acute  











Family History


non contributory





Social History


Smoking Status:  Current Some Day Smoker


Drug Use:  none


Marital Status:  


Occupation Status:  employed





Allergies


Coded Allergies:  


     Sulfa Drugs (Verified  Allergy, Mild, RASH, 9/9/17)





Current Medications





Home Meds and Scripts








 Medications  Dose


 Route/Sig


 Max Daily Dose Days Date Category


 


 Mucinex Ext Rel


  (Guaifenesin) 600


 Mg Tab  1,200 Mg


 PO Q12


    9/9/17 Reported


 


 Aspirin Ec


  (Aspirin) 81 Mg


 Tab  81 Mg


 PO DAILY


    9/9/17 Reported


 


 Multivitamin


  (Multivitamins)


 Tab  1 Tab


 PO DAILY


    9/9/17 Reported


 


 Magnesium


  (Magnesium Oxide


  (Mg Supplement)


 500 Mg Tab  500 Mg


 PO BID


    9/9/17 Reported


 


 Omega-3 (Fish


 Oil) 1 Ea Cap  1 Cap


 PO BID


    9/9/17 Reported


 


 Doxycycline


  (Doxycycline


  (Monohydrate))


 100 Mg Cap  100 Mg


 PO BID


    9/9/17 Reported


 


 Allopurinol 300


 Mg Tab  300 Mg


 PO DAILY


    9/9/17 Reported


 


 Liothyronine


 Sodium 25 Mcg Tab  25 Mcg


 PO DAILY


    9/9/17 Reported


 


 Zoloft


  (Sertraline HCl)


 100 Mg Tab  200 Mg


 PO DAILY


    9/9/17 Reported


 


 Prograf


  (Tacrolimus) 1 Mg


 Cap  1 Mg


 PO QAM


    9/9/17 Reported


 


 Prograf


  (Tacrolimus) 0.5


 Mg Cap  0.5 Mg


 PO QPM


    9/9/17 Reported


 


 Myfortic


  (Mycophenolate


 Sodium) 360 Mg Tab  360 Mg


 PO BID


    9/9/17 Reported


 


 Zaroxolyn


  (Metolazone) 2.5


 Mg Tab  2.5 Mg


 PO DAILY


    9/9/17 Reported


 


 Amiloride Hcl 5


 Mg Tab  5 Mg


 PO DAILY


    9/9/17 Reported


 


 Norvasc


  (Amlodipine


 Besylate) 5 Mg Tab  5 Mg


 PO DAILY


    9/9/17 Reported


 


 Lopressor


  (Metoprolol


 Tartrate) 100 Mg


 Tab  100 Mg


 PO BID


    9/9/17 Reported


 


 Clonidine HCl 0.2


 Mg Tab  0.2 Mg


 PO BID


    9/9/17 Reported


 


 Apresoline


  (Hydralazine Hcl)


 10 Mg Tab  10 Mg


 PO TID


    9/9/17 Reported











Review of Systems


10 ROS otherwise negative





Physical Exam











  Date Time  Temp Pulse Resp B/P (MAP) Pulse Ox O2 Delivery O2 Flow Rate FiO2


 


9/9/17 15:58 37.0 74 16 152/81 (104) 100   


 


9/9/17 15:11  82 20 116/70 95   


 


9/9/17 14:00  76 24 104/74 100   


 


9/9/17 13:00  84 24 148/78 97   


 


9/9/17 12:53  75      


 


9/9/17 12:16     98 Room Air  


 


9/9/17 12:01 36.8 100 18 112/73 97 Room Air  








General Appearance:  WD/WN, no apparent distress


Neck:  no adenopathy


Respiratory/Chest:  lungs clear, no respiratory distress


Cardiovascular:  regular rate, rhythm, no murmur


Abdomen:  normal bowel sounds, non tender, soft, no organomegaly, + pertinent 

finding (physical with nurse present,   rectal no obvious mass, stool black 

place on hemoccult card to send to lab)


Extremities:  normal range of motion, normal inspection


Neurologic/Psych:  CNs II-XII nml as tested


Skin:  normal color, no jaundice





Laboratory Results





Last 24 Hours








Test


  9/9/17


12:20 9/9/17


12:47 9/9/17


16:00


 


White Blood Count 13.17 K/uL   


 


Red Blood Count 2.38 M/uL   


 


Hemoglobin 7.7 g/dL   


 


Hematocrit 21.3 %   


 


Mean Corpuscular Volume 89.5 fL   


 


Mean Corpuscular Hemoglobin 32.4 pg   


 


Mean Corpuscular Hemoglobin


Concent 36.2 g/dl 


  


  


 


 


Platelet Count 297 K/uL   


 


Mean Platelet Volume 10.7 fL   


 


Neutrophils (%) (Auto) 82.2 %   


 


Lymphocytes (%) (Auto) 9.2 %   


 


Monocytes (%) (Auto) 7.1 %   


 


Eosinophils (%) (Auto) 0.7 %   


 


Basophils (%) (Auto) 0.3 %   


 


Neutrophils # (Auto) 10.83 K/uL   


 


Lymphocytes # (Auto) 1.21 K/uL   


 


Monocytes # (Auto) 0.93 K/uL   


 


Eosinophils # (Auto) 0.09 K/uL   


 


Basophils # (Auto) 0.04 K/uL   


 


RDW Standard Deviation 44.9 fL   


 


RDW Coefficient of Variation 16.9 %   


 


Immature Granulocyte % (Auto) 0.5 %   


 


Immature Granulocyte # (Auto) 0.07 K/uL   


 


Nucleated RBC Absolute Count


(auto) 0.09 K/uL 


  


  


 


 


Nucleated Red Blood Cells % 0.7 %   


 


Polychromasia 1+   


 


Sodium Level 136 mmol/L   


 


Potassium Level 2.2 mmol/L   


 


Chloride Level 99 mmol/L   


 


Carbon Dioxide Level 25 mmol/L   


 


Anion Gap 12.0 mmol/L   


 


Blood Urea Nitrogen 33 mg/dl   


 


Creatinine 1.10 mg/dl   


 


Est Creatinine Clear Calc


Drug Dose 72.6 ml/min 


  


  


 


 


Estimated GFR (


American) 88.4 


  


  


 


 


Estimated GFR (Non-


American 76.2 


  


  


 


 


BUN/Creatinine Ratio 30.3   


 


Random Glucose 127 mg/dl   


 


Calcium Level 10.2 mg/dl   


 


Magnesium Level 1.3 mg/dl   


 


Iron Level 61 mcg/dl   


 


Total Iron Binding Capacity 349 mcg/dl   


 


Total Bilirubin 0.6 mg/dl   


 


Direct Bilirubin 0.1 mg/dl   


 


Aspartate Amino Transf


(AST/SGOT) 25 U/L 


  


  


 


 


Alanine Aminotransferase


(ALT/SGPT) 30 U/L 


  


  


 


 


Alkaline Phosphatase 53 U/L   


 


Total Creatine Kinase 147 U/L   


 


Creatine Kinase MB 1.5 ng/ml   


 


Creatine Kinase MB Ratio 1.0   


 


Total Protein 6.8 gm/dl   


 


Albumin 3.4 gm/dl   


 


Bedside Troponin I  < 0.030 ng/ml  











Impression








Melena-- mostly likely ASA induce PUD.  no stools for 48 hours so not actively 

bleeding. , can use Protonix 40 mg po bid. Clears for now. Needs transfusion, K

, corrected and cough issue addressed prior to EGD since not having a life 

threatening bleed. Prepared to scope urgently if has brisk bleed. Otherwise 

plan for monday.  Proc and risks explained which include but not limited to 

medication reaction, bleeding, perforation, aspiration.








anemia--transfuse prn





Other problems per hospitalist:





hypokalemia--recommend oral K better tolerated and patient complaining of IV 

pain with IV K





cough

## 2017-09-10 VITALS — HEART RATE: 84 BPM | OXYGEN SATURATION: 98 %

## 2017-09-10 VITALS — HEART RATE: 74 BPM | SYSTOLIC BLOOD PRESSURE: 127 MMHG | DIASTOLIC BLOOD PRESSURE: 75 MMHG | OXYGEN SATURATION: 98 %

## 2017-09-10 VITALS
DIASTOLIC BLOOD PRESSURE: 78 MMHG | HEART RATE: 96 BPM | OXYGEN SATURATION: 99 % | SYSTOLIC BLOOD PRESSURE: 122 MMHG | TEMPERATURE: 98.06 F

## 2017-09-10 VITALS
DIASTOLIC BLOOD PRESSURE: 81 MMHG | SYSTOLIC BLOOD PRESSURE: 152 MMHG | HEART RATE: 105 BPM | OXYGEN SATURATION: 98 % | TEMPERATURE: 98.42 F

## 2017-09-10 VITALS
DIASTOLIC BLOOD PRESSURE: 76 MMHG | HEART RATE: 108 BPM | SYSTOLIC BLOOD PRESSURE: 132 MMHG | TEMPERATURE: 97.7 F | OXYGEN SATURATION: 98 %

## 2017-09-10 VITALS
TEMPERATURE: 97.52 F | HEART RATE: 90 BPM | SYSTOLIC BLOOD PRESSURE: 137 MMHG | OXYGEN SATURATION: 100 % | DIASTOLIC BLOOD PRESSURE: 83 MMHG

## 2017-09-10 VITALS
SYSTOLIC BLOOD PRESSURE: 128 MMHG | DIASTOLIC BLOOD PRESSURE: 78 MMHG | TEMPERATURE: 97.7 F | HEART RATE: 72 BPM | OXYGEN SATURATION: 98 %

## 2017-09-10 VITALS
HEART RATE: 77 BPM | TEMPERATURE: 97.7 F | DIASTOLIC BLOOD PRESSURE: 75 MMHG | OXYGEN SATURATION: 97 % | SYSTOLIC BLOOD PRESSURE: 144 MMHG

## 2017-09-10 VITALS — DIASTOLIC BLOOD PRESSURE: 73 MMHG | HEART RATE: 82 BPM | SYSTOLIC BLOOD PRESSURE: 125 MMHG | OXYGEN SATURATION: 99 %

## 2017-09-10 VITALS
HEART RATE: 91 BPM | TEMPERATURE: 97.52 F | DIASTOLIC BLOOD PRESSURE: 69 MMHG | OXYGEN SATURATION: 99 % | SYSTOLIC BLOOD PRESSURE: 124 MMHG

## 2017-09-10 VITALS — HEART RATE: 102 BPM | OXYGEN SATURATION: 98 %

## 2017-09-10 VITALS
TEMPERATURE: 97.52 F | DIASTOLIC BLOOD PRESSURE: 84 MMHG | SYSTOLIC BLOOD PRESSURE: 141 MMHG | HEART RATE: 87 BPM | OXYGEN SATURATION: 100 %

## 2017-09-10 VITALS
DIASTOLIC BLOOD PRESSURE: 78 MMHG | HEART RATE: 91 BPM | SYSTOLIC BLOOD PRESSURE: 125 MMHG | OXYGEN SATURATION: 99 % | TEMPERATURE: 98.96 F

## 2017-09-10 VITALS
TEMPERATURE: 98.06 F | OXYGEN SATURATION: 98 % | SYSTOLIC BLOOD PRESSURE: 120 MMHG | DIASTOLIC BLOOD PRESSURE: 76 MMHG | HEART RATE: 94 BPM

## 2017-09-10 VITALS — HEART RATE: 98 BPM | OXYGEN SATURATION: 99 %

## 2017-09-10 LAB
ALBUMIN/GLOB SERPL: 1 {RATIO} (ref 0.9–2)
ALP SERPL-CCNC: 41 U/L (ref 45–117)
ALT SERPL-CCNC: 30 U/L (ref 12–78)
ANION GAP SERPL CALC-SCNC: 11 MMOL/L (ref 3–11)
ANION GAP SERPL CALC-SCNC: 7 MMOL/L (ref 3–11)
AST SERPL-CCNC: 23 U/L (ref 15–37)
BASOPHILS # BLD: 0.01 K/UL (ref 0–0.2)
BASOPHILS NFR BLD: 0.1 %
BUN SERPL-MCNC: 27 MG/DL (ref 7–18)
BUN SERPL-MCNC: 27 MG/DL (ref 7–18)
BUN/CREAT SERPL: 24.9 (ref 10–20)
BUN/CREAT SERPL: 27.4 (ref 10–20)
CALCIUM SERPL-MCNC: 8.9 MG/DL (ref 8.5–10.1)
CALCIUM SERPL-MCNC: 9.3 MG/DL (ref 8.5–10.1)
CHLORIDE SERPL-SCNC: 102 MMOL/L (ref 98–107)
CHLORIDE SERPL-SCNC: 106 MMOL/L (ref 98–107)
CKMB/CK RATIO: 1.7 (ref 0–3)
CO2 SERPL-SCNC: 22 MMOL/L (ref 21–32)
CO2 SERPL-SCNC: 24 MMOL/L (ref 21–32)
COMPLETE: YES
CREAT CL PREDICTED SERPL C-G-VRATE: 72.6 ML/MIN
CREAT CL PREDICTED SERPL C-G-VRATE: 79.9 ML/MIN
CREAT SERPL-MCNC: 1 MG/DL (ref 0.6–1.4)
CREAT SERPL-MCNC: 1.1 MG/DL (ref 0.6–1.4)
EOSINOPHIL NFR BLD AUTO: 192 K/UL (ref 130–400)
GLOBULIN SER-MCNC: 2.9 GM/DL (ref 2.5–4)
GLUCOSE SERPL-MCNC: 160 MG/DL (ref 70–99)
GLUCOSE SERPL-MCNC: 174 MG/DL (ref 70–99)
HCT VFR BLD CALC: 20.6 % (ref 42–52)
HCT VFR BLD CALC: 23.2 % (ref 42–52)
IG%: 0.4 %
IMM GRANULOCYTES NFR BLD AUTO: 5.9 %
LYMPHOCYTES # BLD: 0.67 K/UL (ref 1.2–3.4)
MAGNESIUM SERPL-MCNC: 1.9 MG/DL (ref 1.8–2.4)
MCH RBC QN AUTO: 31.2 PG (ref 25–34)
MCHC RBC AUTO-ENTMCNC: 35 G/DL (ref 32–36)
MCV RBC AUTO: 89.2 FL (ref 80–100)
MONOCYTES NFR BLD: 2.5 %
NEUTROPHILS # BLD AUTO: 0 %
NEUTROPHILS NFR BLD AUTO: 91.1 %
PMV BLD AUTO: 10.1 FL (ref 7.4–10.4)
POLYCHROMASIA BLD QL SMEAR: (no result)
POTASSIUM SERPL-SCNC: 3 MMOL/L (ref 3.5–5.1)
POTASSIUM SERPL-SCNC: 3.4 MMOL/L (ref 3.5–5.1)
RBC # BLD AUTO: 2.31 M/UL (ref 4.7–6.1)
SODIUM SERPL-SCNC: 135 MMOL/L (ref 136–145)
SODIUM SERPL-SCNC: 137 MMOL/L (ref 136–145)
WBC # BLD AUTO: 11.42 K/UL (ref 4.8–10.8)

## 2017-09-10 RX ADMIN — HYDRALAZINE HYDROCHLORIDE SCH MG: 10 TABLET ORAL at 20:21

## 2017-09-10 RX ADMIN — IPRATROPIUM BROMIDE AND ALBUTEROL SULFATE SCH ML: .5; 3 SOLUTION RESPIRATORY (INHALATION) at 12:00

## 2017-09-10 RX ADMIN — TACROLIMUS SCH MG: 0.5 CAPSULE ORAL at 20:23

## 2017-09-10 RX ADMIN — PANTOPRAZOLE SCH MG: 40 TABLET, DELAYED RELEASE ORAL at 08:10

## 2017-09-10 RX ADMIN — METOPROLOL TARTRATE SCH MG: 5 INJECTION, SOLUTION INTRAVENOUS at 06:01

## 2017-09-10 RX ADMIN — Medication SCH MG: at 20:22

## 2017-09-10 RX ADMIN — AMLODIPINE BESYLATE SCH MG: 5 TABLET ORAL at 08:10

## 2017-09-10 RX ADMIN — CLONIDINE HYDROCHLORIDE SCH MG: 0.1 TABLET ORAL at 20:22

## 2017-09-10 RX ADMIN — CLONIDINE HYDROCHLORIDE SCH MG: 0.1 TABLET ORAL at 08:09

## 2017-09-10 RX ADMIN — Medication SCH MG: at 08:11

## 2017-09-10 RX ADMIN — IPRATROPIUM BROMIDE AND ALBUTEROL SULFATE SCH ML: .5; 3 SOLUTION RESPIRATORY (INHALATION) at 07:02

## 2017-09-10 RX ADMIN — DEXTROSE MONOHYDRATE, SODIUM CHLORIDE, AND POTASSIUM CHLORIDE SCH MLS/HR: 50; 9; 1.49 INJECTION, SOLUTION INTRAVENOUS at 02:33

## 2017-09-10 RX ADMIN — TACROLIMUS SCH MG: 1 CAPSULE ORAL at 08:11

## 2017-09-10 RX ADMIN — METOPROLOL TARTRATE SCH MG: 5 INJECTION, SOLUTION INTRAVENOUS at 00:33

## 2017-09-10 RX ADMIN — IPRATROPIUM BROMIDE AND ALBUTEROL SULFATE SCH ML: .5; 3 SOLUTION RESPIRATORY (INHALATION) at 19:08

## 2017-09-10 RX ADMIN — METHYLPREDNISOLONE SODIUM SUCCINATE SCH MLS/MIN: 1 INJECTION, POWDER, FOR SOLUTION INTRAMUSCULAR; INTRAVENOUS at 06:01

## 2017-09-10 RX ADMIN — METOPROLOL TARTRATE SCH MG: 5 INJECTION, SOLUTION INTRAVENOUS at 17:26

## 2017-09-10 RX ADMIN — METHYLPREDNISOLONE SODIUM SUCCINATE SCH MLS/MIN: 1 INJECTION, POWDER, FOR SOLUTION INTRAMUSCULAR; INTRAVENOUS at 13:26

## 2017-09-10 RX ADMIN — METOPROLOL TARTRATE SCH MG: 5 INJECTION, SOLUTION INTRAVENOUS at 17:28

## 2017-09-10 RX ADMIN — HYDRALAZINE HYDROCHLORIDE SCH MG: 10 TABLET ORAL at 14:32

## 2017-09-10 RX ADMIN — IPRATROPIUM BROMIDE AND ALBUTEROL SULFATE SCH ML: .5; 3 SOLUTION RESPIRATORY (INHALATION) at 15:30

## 2017-09-10 RX ADMIN — METHYLPREDNISOLONE SODIUM SUCCINATE SCH MLS/MIN: 1 INJECTION, POWDER, FOR SOLUTION INTRAMUSCULAR; INTRAVENOUS at 20:24

## 2017-09-10 RX ADMIN — METOPROLOL TARTRATE SCH MG: 5 INJECTION, SOLUTION INTRAVENOUS at 13:25

## 2017-09-10 RX ADMIN — SERTRALINE HYDROCHLORIDE SCH MG: 100 TABLET, FILM COATED ORAL at 08:11

## 2017-09-10 RX ADMIN — METOPROLOL TARTRATE SCH MG: 5 INJECTION, SOLUTION INTRAVENOUS at 20:21

## 2017-09-10 RX ADMIN — PANTOPRAZOLE SCH MG: 40 TABLET, DELAYED RELEASE ORAL at 20:24

## 2017-09-10 RX ADMIN — METOPROLOL TARTRATE SCH MG: 5 INJECTION, SOLUTION INTRAVENOUS at 23:39

## 2017-09-10 RX ADMIN — MYCOPHENOLIC ACID SCH MG: 180 TABLET, DELAYED RELEASE ORAL at 08:11

## 2017-09-10 RX ADMIN — MYCOPHENOLIC ACID SCH MG: 180 TABLET, DELAYED RELEASE ORAL at 20:23

## 2017-09-10 NOTE — PROGRESS NOTE
Progress Note


Date of Service


Sep 10, 2017.





Progress Note


called due to neck tightness, anxiety, and sob.  stat EKG ordered.  pt 

immediately seen and examined.  notes that he was playing scrabble w wife, tile 

fell on the floor, he went down to get tile, then had sudden onset of above sx.

  very restless.  nursing notes that he stated he feels like he's gonna die.  


vitals as in EMR.  very restless, anxious, distressed, no pallor but maybe a 

hint of early diaphoresis.  tachycardia but no r/mg.  lungs cta b/l no r/r/w 

good effort. no edema


EKG w ST depressions V4-6, some in V3.  no ST elevations





IV beta blocker given stat - ~50% improvement in sx after first dose


   -gave 2 more doses at 5mins for total of 3, near total resolution (about a 1

") of sx then.  SL nitro x1, sx totally resolved


   -f/u EKG NSR ST segment changes resolved


   -d/w GI, d/w cardiology - for now plan is med management, transfuse (as 

anemia likely the current source for supply demand mismatch)





unstable angina/possible NSTEMI


>30mins in critical care time with fortunately a total resolution of sx and EKG 

changes


Oxygen, beta blocker, nitrates, morphine prn (held on asa but d/w GI and can 

give if sx return or are refractory but since anemia likely the main nidus for 

supply demand, correcting this should help more, and antiplatelets/

anticoagulants obviously could worsen the anemia that caused the unstable angina

)(held heparin for similar reasons, but would only use it if last resort 

refractory sx and heading to cath lab)


-cardiology notified and aware for if sx return/refractory


-GI notified and aware - EGD on hold for tomorrow (hopefully by tuesday if 

supply/demand balance can be favorably maintained after transfusion) and aware 

of situation in case asa were needed


-continue metoprolol 5mg IV q4 scheduled, continue nitro prn for now (can 

utilize long acting if angina recurs but now sx free)


-night coverage given full detailed signout


-troponin and CKMB noted - anticipate a further rise but again now totally 

asymptomatic and EKG changes have normalized


-anticipate LHC in near future (likely electively once GI bleeding is settled 

so that if antiplatelets needed can be maintained)





pt and wife pleased with care





again >30mins critical care time separate entirely from visit earlier today

## 2017-09-10 NOTE — GASTROENTEROLOGY PROGRESS NOTE
Progress Note


Date of Service:  Sep 10, 2017


Subjective


Pt evaluation today including:  conversation w/ patient, conversation w/ family 

(friend lexy), physical exam


cc f/u melena


HPI No stools since admit. SOB and cough are improved. No abd pain.





Review of Systems


Respiratory:  No shortness of breath


Cardiac:  No chest pain





Medications





Current Inpatient Medications








 Medications


  (Trade)  Dose


 Ordered  Sig/Juanita


 Route  Start Time


 Stop Time Status Last Admin


Dose Admin


 


 Amlodipine


 Besylate


  (Norvasc Tab)  5 mg  DAILY


 PO  9/10/17 09:00


 10/10/17 08:59  9/10/17 08:10


5 MG


 


 Sertraline HCl


  (Zoloft Tab)  200 mg  DAILY


 PO  9/10/17 09:00


 10/10/17 08:59  9/10/17 08:11


200 MG


 


 Tacrolimus


  (Prograf Cap)  0.5 mg  QPM


 PO  9/9/17 21:00


 10/9/17 20:59  9/9/17 20:20


0.5 MG


 


 Tacrolimus


  (Prograf Cap)  1 mg  QAM


 PO  9/10/17 09:00


 10/10/17 08:59  9/10/17 08:11


1 MG


 


 Clonidine HCl


  (Catapres Tab)  0.2 mg  BID


 PO  9/9/17 21:00


 10/9/17 20:59  9/10/17 08:09


0.2 MG


 


 Magnesium Oxide


  (Mag-Ox Tab)  400 mg  BID


 PO  9/9/17 21:00


 10/9/17 20:59  9/10/17 08:11


400 MG


 


 Mycophenolate


 Sodium


  (Myfortic Tab)  360 mg  BID


 PO  9/9/17 21:00


 10/9/17 20:59  9/10/17 08:11


360 MG


 


 Methylprednisolone


 Sodium Succinate


 40 mg/Syringe  0.64 ml @ 


 1.5 mls/min  Q8


 IV  9/9/17 22:00


 10/9/17 21:59  9/10/17 13:26


1.5 MLS/MIN


 


 Acetaminophen


  (Tylenol Tab)  650 mg  Q4H  PRN


 PO  9/9/17 15:00


 10/9/17 14:59   


 


 


 Ondansetron HCl


  (Zofran Inj)  4 mg  Q6H  PRN


 IV  9/9/17 15:00


 10/9/17 14:59   


 


 


 Albuterol/


 Ipratropium


  (Duoneb)  3 ml  QIDR


 INH  9/9/17 16:00


 10/9/17 15:59  9/10/17 07:02


3 ML


 


 Bumetanide


  (Bumex Tab)  0.5 mg  QAM  PRN


 PO  9/9/17 15:45


 10/9/17 15:44   


 


 


 Albuterol/


 Ipratropium


  (Duoneb)  3 ml  Q2H  PRN


 INH  9/9/17 16:15


 10/9/17 16:14   


 


 


 Pantoprazole


 Sodium


  (Protonix Tab)  40 mg  BID


 PO  9/9/17 21:00


 10/9/17 20:59  9/10/17 08:10


40 MG


 


 Hydralazine HCl


  (Apresoline Tab)  10 mg  TID


 PO  9/10/17 14:00


 10/10/17 13:59  9/10/17 14:32


10 MG


 


 Liothyronine


 Sodium


  (Cytomel Tab)  25 mcg  DAILY


 PO  9/11/17 09:00


 10/11/17 08:59   


 


 


 Metoprolol


 Tartrate


  (Lopressor Tab)  100 mg  BID


 PO  9/10/17 21:00


 10/10/17 20:59   


 











Objective


Vital Signs











  Date Time  Temp Pulse Resp B/P (MAP) Pulse Ox O2 Delivery O2 Flow Rate FiO2


 


9/10/17 13:25  85  137/65    


 


9/10/17 11:41 36.4 90 20 137/83 (101) 100 Room Air  


 


9/10/17 07:19 36.4 87 18 141/84 (103) 100 Room Air  


 


9/10/17 07:02  84 18  98 Room Air  


 


9/10/17 06:01  77  144/75    


 


9/10/17 04:15 36.5 77 18 144/75 (98) 97 Room Air  


 


9/10/17 00:33  78  121/76    


 


9/9/17 23:24 35.8 78 22 121/76 (91) 99 Room Air  


 


9/9/17 19:51 36.8 93 16 146/82 (103) 97   


 


9/9/17 18:19 36.9 82 18 128/79 100   


 


9/9/17 18:04  82  126/71    


 


9/9/17 17:38 37.1 82 18 126/71 100  2.0 


 


9/9/17 17:21 37.1 77 18 129/79 100   


 


9/9/17 15:58 37.0 74 16 152/81 (104) 100   


 


9/9/17 15:18 37.0 74 16 152/81 100 Nasal Cannula 2.0 


 


9/9/17 15:11  82 20 116/70 95   











Physical Exam


General Appearance:  WD/WN, no apparent distress


Respiratory/Chest:  lungs clear, no respiratory distress


Cardiovascular:  regular rate, rhythm


Abdomen:  normal bowel sounds, non tender, soft, no organomegaly





Laboratory Results





Last 24 Hours








Test


  9/9/17


17:00 9/9/17


21:35 9/10/17


00:15 9/10/17


04:02


 


Stool Occult Blood POSITIVE    


 


Hemoglobin  8.0 g/dL   7.2 g/dL 


 


Prothrombin Time  11.1 SECONDS   


 


Prothromb Time International


Ratio 


  1.0 


  


  


 


 


Activated Partial


Thromboplast Time 


  21.0 SECONDS 


  


  


 


 


Partial Thromboplastin Ratio  0.8   


 


Hepatitis C Antibody Screen  NEG   


 


Sodium Level   135 mmol/L  137 mmol/L 


 


Potassium Level   3.0 mmol/L  3.4 mmol/L 


 


Chloride Level   102 mmol/L  106 mmol/L 


 


Carbon Dioxide Level   22 mmol/L  24 mmol/L 


 


Anion Gap   11.0 mmol/L  7.0 mmol/L 


 


Blood Urea Nitrogen   27 mg/dl  27 mg/dl 


 


Creatinine   1.10 mg/dl  1.00 mg/dl 


 


Est Creatinine Clear Calc


Drug Dose 


  


  72.6 ml/min 


  79.9 ml/min 


 


 


Estimated GFR (


American) 


  


  88.4 


  99.2 


 


 


Estimated GFR (Non-


American 


  


  76.2 


  85.5 


 


 


BUN/Creatinine Ratio   24.9  27.4 


 


Random Glucose   174 mg/dl  160 mg/dl 


 


Calcium Level   9.3 mg/dl  8.9 mg/dl 


 


White Blood Count    11.42 K/uL 


 


Red Blood Count    2.31 M/uL 


 


Hematocrit    20.6 % 


 


Mean Corpuscular Volume    89.2 fL 


 


Mean Corpuscular Hemoglobin    31.2 pg 


 


Mean Corpuscular Hemoglobin


Concent 


  


  


  35.0 g/dl 


 


 


Platelet Count    192 K/uL 


 


Mean Platelet Volume    10.1 fL 


 


Neutrophils (%) (Auto)    91.1 % 


 


Lymphocytes (%) (Auto)    5.9 % 


 


Monocytes (%) (Auto)    2.5 % 


 


Eosinophils (%) (Auto)    0.0 % 


 


Basophils (%) (Auto)    0.1 % 


 


Neutrophils # (Auto)    10.40 K/uL 


 


Lymphocytes # (Auto)    0.67 K/uL 


 


Monocytes # (Auto)    0.29 K/uL 


 


Eosinophils # (Auto)    0.00 K/uL 


 


Basophils # (Auto)    0.01 K/uL 


 


RDW Standard Deviation    45.6 fL 


 


RDW Coefficient of Variation    17.7 % 


 


Immature Granulocyte % (Auto)    0.4 % 


 


Immature Granulocyte # (Auto)    0.05 K/uL 


 


Polychromasia    1+ 


 


Magnesium Level    1.9 mg/dl 


 


Total Bilirubin    0.5 mg/dl 


 


Aspartate Amino Transf


(AST/SGOT) 


  


  


  23 U/L 


 


 


Alanine Aminotransferase


(ALT/SGPT) 


  


  


  30 U/L 


 


 


Alkaline Phosphatase    41 U/L 


 


Total Protein    5.7 gm/dl 


 


Albumin    2.8 gm/dl 


 


Globulin    2.9 gm/dl 


 


Albumin/Globulin Ratio    1.0 


 


Test


  9/10/17


10:30 9/10/17


14:08 


  


 


 


Hemoglobin 8.2 g/dL  7.8 g/dL   


 


Hematocrit 23.2 %    











Assessment and Plan


Melena-- mostly likely ASA induce PUD.  No stools this admit. Plan EGD 

tomorrow. Full liquid diet then npo post MN. 








anemia--transfuse prn





Other problems per hospitalist:





hypokalemia--improved on oral K





cough/SOB improved.








I am off service tomorrow 9/11/17 at 0730 with DR Cross assuming GI care then.

## 2017-09-10 NOTE — HOSPITALIST PROGRESS NOTE
Hospitalist Progress Note


Date of Service


Sep 10, 2017.


 (Naheed Fernandez ., PA-C)





Subjective


Pt evaluation today including:  conversation w/ patient, physical exam, chart 

review, lab review, review of studies, review of inpatient medication list


Pain:  None


PO Intake:  Tolerating clear liquid diet


Voiding:  no voiding problems


Patient reports feeling better.  He still complains of shortness of breath and 

dyspnea on exertion but state that these have improved.  His coughing has 

greatly improved and he has in fact not noticed any significant coughing at all 

today.  He still reports feeling weak and fatigued.  He denies any bowel 

movements since admission and no bleeding per rectum.  He is tolerating a clear 

liquid diet well.  The patient denies fevers, chills, sweats, chest pain, 

palpitations, claudication, cough, wheezing, nausea, vomiting, abdominal pain, 

dysuria, hematuria, urinary retention, paralysis, motor weakness, numbness and 

tingling.





   Additional Comments:


See HPI for pertinent positives and negatives.  All other systems reviewed and 

negative.


 (Naheed Fernandez ., PA-C)





Objective


Vital Signs











  Date Time  Temp Pulse Resp B/P (MAP) Pulse Ox O2 Delivery O2 Flow Rate FiO2


 


9/10/17 13:25  85  137/65    


 


9/10/17 11:41 36.4 90 20 137/83 (101) 100 Room Air  


 


9/10/17 07:19 36.4 87 18 141/84 (103) 100 Room Air  


 


9/10/17 07:02  84 18  98 Room Air  


 


9/10/17 06:01  77  144/75    


 


9/10/17 04:15 36.5 77 18 144/75 (98) 97 Room Air  


 


9/10/17 00:33  78  121/76    


 


9/9/17 23:24 35.8 78 22 121/76 (91) 99 Room Air  


 


9/9/17 19:51 36.8 93 16 146/82 (103) 97   


 


9/9/17 18:19 36.9 82 18 128/79 100   


 


9/9/17 18:04  82  126/71    


 


9/9/17 17:38 37.1 82 18 126/71 100  2.0 


 


9/9/17 17:21 37.1 77 18 129/79 100   


 


9/9/17 15:58 37.0 74 16 152/81 (104) 100   


 


9/9/17 15:18 37.0 74 16 152/81 100 Nasal Cannula 2.0 


 


9/9/17 15:11  82 20 116/70 95   


 


9/9/17 14:00  76 24 104/74 100   








 (Naheed Fernandez PA-C)





Physical Exam


Notes:


General appearance:  +Appears chronically ill.  Well-developed, well-nourished, 

no apparent distress


Head:  Normocephalic, atraumatic


Eyes:  Normal inspection, PERRL, EOMI


ENT:  Normal ENT inspection, hearing grossly normal, pharynx normal


Neck:  Supple, no JVD, trachea midline


Respiratory/Chest:  +Diminished breath sounds throughout.  Lungs clear to 

auscultation.  No respiratory distress


Cardiovascular:  +Systolic murmur.  Regular rate & rhythm, no gallop


Abdomen/GI:  Normal bowel sounds, non-tender, soft


Extremities/Musculoskeletal:  Normal inspection, no calf tenderness, no pedal 

edema


Neurological/Psych:  Alert, normal mood/affect, oriented x 3


Skin:  Normal color, warm/dry, no rash


 (Naheed Fernandez ., PA-C)





Laboratory Results





Last 24 Hours








Test


  9/9/17


17:00 9/9/17


21:35 9/10/17


00:15 9/10/17


04:02


 


Stool Occult Blood POSITIVE    


 


Hemoglobin  8.0 g/dL   7.2 g/dL 


 


Prothrombin Time  11.1 SECONDS   


 


Prothromb Time International


Ratio 


  1.0 


  


  


 


 


Activated Partial


Thromboplast Time 


  21.0 SECONDS 


  


  


 


 


Partial Thromboplastin Ratio  0.8   


 


Hepatitis C Antibody Screen  NEG   


 


Sodium Level   135 mmol/L  137 mmol/L 


 


Potassium Level   3.0 mmol/L  3.4 mmol/L 


 


Chloride Level   102 mmol/L  106 mmol/L 


 


Carbon Dioxide Level   22 mmol/L  24 mmol/L 


 


Anion Gap   11.0 mmol/L  7.0 mmol/L 


 


Blood Urea Nitrogen   27 mg/dl  27 mg/dl 


 


Creatinine   1.10 mg/dl  1.00 mg/dl 


 


Est Creatinine Clear Calc


Drug Dose 


  


  72.6 ml/min 


  79.9 ml/min 


 


 


Estimated GFR (


American) 


  


  88.4 


  99.2 


 


 


Estimated GFR (Non-


American 


  


  76.2 


  85.5 


 


 


BUN/Creatinine Ratio   24.9  27.4 


 


Random Glucose   174 mg/dl  160 mg/dl 


 


Calcium Level   9.3 mg/dl  8.9 mg/dl 


 


White Blood Count    11.42 K/uL 


 


Red Blood Count    2.31 M/uL 


 


Hematocrit    20.6 % 


 


Mean Corpuscular Volume    89.2 fL 


 


Mean Corpuscular Hemoglobin    31.2 pg 


 


Mean Corpuscular Hemoglobin


Concent 


  


  


  35.0 g/dl 


 


 


Platelet Count    192 K/uL 


 


Mean Platelet Volume    10.1 fL 


 


Neutrophils (%) (Auto)    91.1 % 


 


Lymphocytes (%) (Auto)    5.9 % 


 


Monocytes (%) (Auto)    2.5 % 


 


Eosinophils (%) (Auto)    0.0 % 


 


Basophils (%) (Auto)    0.1 % 


 


Neutrophils # (Auto)    10.40 K/uL 


 


Lymphocytes # (Auto)    0.67 K/uL 


 


Monocytes # (Auto)    0.29 K/uL 


 


Eosinophils # (Auto)    0.00 K/uL 


 


Basophils # (Auto)    0.01 K/uL 


 


RDW Standard Deviation    45.6 fL 


 


RDW Coefficient of Variation    17.7 % 


 


Immature Granulocyte % (Auto)    0.4 % 


 


Immature Granulocyte # (Auto)    0.05 K/uL 


 


Polychromasia    1+ 


 


Magnesium Level    1.9 mg/dl 


 


Total Bilirubin    0.5 mg/dl 


 


Aspartate Amino Transf


(AST/SGOT) 


  


  


  23 U/L 


 


 


Alanine Aminotransferase


(ALT/SGPT) 


  


  


  30 U/L 


 


 


Alkaline Phosphatase    41 U/L 


 


Total Protein    5.7 gm/dl 


 


Albumin    2.8 gm/dl 


 


Globulin    2.9 gm/dl 


 


Albumin/Globulin Ratio    1.0 


 


Test


  9/10/17


10:30 


  


  


 


 


Hemoglobin 8.2 g/dL    


 


Hematocrit 23.2 %    








 (Naheed Fernandez, BEENA)





Diagnostic Results


Reviewed the following studies and agree with interpretation as follows:





                                                                               

                                                                 


Patient Name: MARY LOPEZ                               Unit Number:  

[id number]                  


 





 











Dictated: 09/09/17 1603


 


Transcribed: 09/09/17 1603


 


MS


 


Printed Date/Time: [~ rep prt dt]/[~ rep prt tm]








 [~ rep ct labl] - [~ rep ct ivnm]


 





   [facility]


 Radiology Department


 [place] [address] [phone]





 











Dictated: 09/09/17 1603


 


Transcribed: 09/09/17 1603


 


MS


 


Printed Date/Time: [~ rep prt dt]/[~ rep prt tm]








 [~ rep ct labl] - [~ rep ct ivnm]


 





 











Patient:  MARY LOPEZ Address1:  [address] [facility] Rec:  [id number] Address2:  


 


Acct ID:  [id number] [facility] Zip:  [place] [address]


 


YOB: 1965          Sex:  M Room/Bed:  S242-1


 


Ref Phy:  Gray Monahan M.D. SC: [id number]


 


Att Phy:  Claus White M.D. Report #:  [id number]


 


[place] Phy:  Gray Monahan M.D. Test:  [id number]


 


Admit Phy:  Claus White M.D. Technician:  LETICIA


 


Interpreting Phy:  Zelalem Bowers M.D. Diagnosis:  SYMPTOMATIC ANEMIA


 


Ordering Phy:  Naheed Fernandez PA-C Service Date:  09/09/17


 


Admit Date: 09/09/1709/09/17 MNE: [id number]


 


 CONF:


 


 DICTATED BY: Zelalem Bowers M.D.]]


 


CC: Naheed Fernandez .BEENA Jonathan, M.D. Kedem, Roy ., M.D.


 


 Endcc:











[~ rep ct add3]]


(CHEST) THORAX WITHOUT





CT DOSE: 273.63 mGy.cm





HISTORY: Dyspnea  shortness of breath





TECHNIQUE: Multiaxial CT images of the chest were performed without contrast.  A


dose lowering technique was utilized adhering to the principles of ALARA.








COMPARISON: None.





FINDINGS: The lungs are clear. The mediastinal vascular structures are within


normal limits. No mediastinal or hilar lymphadenopathy. No pleural effusion or


pneumothorax. Limited views of the upper abdomen demonstrate a normal liver and


spleen. Kidneys are heavily calcified





IMPRESSION: 


No acute process. 














The above report was generated using voice recognition software.  It may contain


grammatical, syntax or spelling errors.











Electronically signed by:  Zelalem Bowers M.D.


9/9/2017 4:05 PM





Dictated Date/Time:  9/9/2017 4:03 PM





 The status of this report is Signed.   


 Draft = Not yet reviewed or approved by Radiologist.  


 Signed = Reviewed and approved by Radiologist.


<AttendingPhy>Claus White M.D.</AttendingPhy> <FamilyPhy>Gray Monahan M.D.

</FamilyPhy> <PrimaryPhy>Gray Monahan M.D.</PrimaryPhy> <UnitNumber>

[id number]</UnitNumber> <VisitNumber>[id number]</VisitNumber> <PatientName>

MARY LOPEZ</PatientName> <DateOfBirth>1965</DateOfBirth> <Location>

C.2T</Location> <ServiceDate>09/09/17</ServiceDate> <MNE>ESINDI</MNE> <

OrderingPhy>Naheed Fernandez PA-C</OrderingPhy> <OrderingPhyMNE>f rep ord dr webb<

/OrderingPhyMNE> <DictatingPhyMNE>f rep dict dr webb</DictatingPhyMNE> <CCListMNE

>f rep ct mne</CCListMNE> <AdmittingPhyMNE>f pt admit dr webb</AdmittingPhyMNE> <

AttendingPhyMNE>f pt attend dr webb</AttendingPhyMNE>


<ConsultingPhyMNE>f pt consult dr webb</ConsultingPhyMNE> <FamilyPhyMNE>f pt fam 

dr webb</FamilyPhyMNE> <OtherPhyMNE>f pt other dr webb</OtherPhyMNE> <

PrimaryPhyMNE>f pt prim care dr webb</PrimaryPhyMNE> <ReferringPhyMNE>f pt 

referring dr webb</ReferringPhyMNE>


 (Naheed Fernandez .BEENA)





Assessment and Plan


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 

transplant x 3, HTN, hypothyroidism, depression and gout who presented to the 

ED on 9/9 with shortness of breath, non-productive cough, dizziness and melena.

  Patient afebrile, VSS on arrival.  CXR negative for acute disease.  WBC 

elevated 13.17.  Hgb 7.7, baseline 15.  Potassium 2.2.  Magnesium 1.3.  Cardiac 

enzymes negative.  EKG no ischemic changes.  





Acute blood loss anemia secondary to GI bleed--stable


-Admit to telemetry.  Pt mostly in sinus rhythm with HR in 70s-90s, but pt has 

been having intermittent short bursts of tachycardia with HR 140s-160s.  BP has 

been stable


-1 unit PRBC transfused 9/9


-Hgb stable at 8.2 on 9/10.  Pt's symptoms improving, will hold off further 

transfusions for now unless significant drop in Hgb or more symptomatic


-Check H&h q6 x 4


-Iron and TIBC WNL


-GI consulted, appreciate recs:  likely ASA-induced PUD.  Can d/c Protonix drip 

and convert to Protonix 40 mg PO BID.  Clear liquid diet.  EGD tentatively 

Monday.


-Protonix 40 mg PO BID, drip d/c'd


-Tolerating clear liquid diet. Convert back to PO home meds


-NPO after midnight for tentative EGD on 9/11


-Hold aspirin and fish oil


-IVF d/c'd





?Bronchitis vs COPD exacerbation--improving.  SOB/CHAPIN improved, no cough


-Pt denies h/o COPD, no history noted in outpatient records or h/o inhalers.  

CXR per my interpretation appears emphysematous


-CT chest w/o contrast no acute disease


-Pt completed 5 days doxycycline outpatient, will hold off further abx for now


-DuoNebs QIDR and q2h prn SOB/wheezing


-Solu-Medrol 40 mg IV TID.  Pt received 125 mg loading dose in ED


-Recommend outpatient PFTs after recovered from acute anemia for more 

definitive diagnosis/see if there is truly underlying pulmonary disease





Hypokalemia, hypomagnesemia--improving


-H/o hypomagnesemia.  Upon review of lab history, magnesium typically around 1.5


-Potassium 2.2 on admission, magnesium 1.3


-Potassium 3.4 on 9/10, magnesium 1.9 on 9/10


-Replace prn.  Pt did not tolerate IV KCl, replace with PO if needed





ESRD secondary to Alport syndrome, s/p renal transplant x 3--stable


-Baseline creatinine around 1, creatinine 1.1 on admission


-Continue Myfortic 360 mg PO BID and tacrolimus 1 mg PO qam and 0.5 mg PO qpm





HTN--stable


-Continue Lopressor 100 mg PO BID, hydralazine 10 mg PO TID, clonidine 0.2 mg 

PO BID and Norvasc 5 mg PO qd


-Hold amiloride and metolazone for now


-Bumex 0.5 mg PO qd prn fluid overload





Hypothyroidism


-Continue Cytomel 25 mcg PO qd





Depression


-Continue Zoloft 200 mg PO qd





Gout


-Allopurinol held





DVT prophylaxis


-Hold chemical prophylaxis due bleeding


-SCDs





Code Status


-Level I, FULL RESUSCITATION STATUS


 (Naheed Fernandez ., PADonaldC)


i personally examined pt and verified all nunn points w A Jim PAC


feeling OK playign scrabble w wife.  no new complaints.  d/w GI - for EGD 

tomorrow


no sx.


vitals noted nad breathing unlabored no pallor or icterus





anemia/GI bleed - no indications for transfusion at this time - asymptomatic 

and hemodynamically stable.  EGD tomorrow


otherwise as above


 (Jamey Bland D.O.)

## 2017-09-11 VITALS
DIASTOLIC BLOOD PRESSURE: 79 MMHG | OXYGEN SATURATION: 98 % | HEART RATE: 71 BPM | TEMPERATURE: 97.88 F | SYSTOLIC BLOOD PRESSURE: 137 MMHG

## 2017-09-11 VITALS
HEART RATE: 67 BPM | SYSTOLIC BLOOD PRESSURE: 149 MMHG | TEMPERATURE: 97.7 F | DIASTOLIC BLOOD PRESSURE: 85 MMHG | OXYGEN SATURATION: 99 %

## 2017-09-11 VITALS
HEART RATE: 93 BPM | SYSTOLIC BLOOD PRESSURE: 144 MMHG | DIASTOLIC BLOOD PRESSURE: 88 MMHG | OXYGEN SATURATION: 97 % | TEMPERATURE: 97.88 F

## 2017-09-11 VITALS
HEART RATE: 69 BPM | SYSTOLIC BLOOD PRESSURE: 150 MMHG | TEMPERATURE: 97.52 F | OXYGEN SATURATION: 99 % | DIASTOLIC BLOOD PRESSURE: 79 MMHG

## 2017-09-11 VITALS — OXYGEN SATURATION: 98 % | HEART RATE: 70 BPM

## 2017-09-11 VITALS
HEART RATE: 72 BPM | OXYGEN SATURATION: 100 % | SYSTOLIC BLOOD PRESSURE: 145 MMHG | TEMPERATURE: 97.7 F | DIASTOLIC BLOOD PRESSURE: 82 MMHG

## 2017-09-11 VITALS — OXYGEN SATURATION: 98 % | HEART RATE: 62 BPM

## 2017-09-11 VITALS — DIASTOLIC BLOOD PRESSURE: 81 MMHG | SYSTOLIC BLOOD PRESSURE: 138 MMHG | HEART RATE: 69 BPM | OXYGEN SATURATION: 100 %

## 2017-09-11 VITALS
DIASTOLIC BLOOD PRESSURE: 82 MMHG | SYSTOLIC BLOOD PRESSURE: 137 MMHG | HEART RATE: 86 BPM | OXYGEN SATURATION: 98 % | TEMPERATURE: 97.7 F

## 2017-09-11 LAB
ANION GAP SERPL CALC-SCNC: 9 MMOL/L (ref 3–11)
BUN SERPL-MCNC: 26 MG/DL (ref 7–18)
BUN/CREAT SERPL: 29 (ref 10–20)
CALCIUM SERPL-MCNC: 9.5 MG/DL (ref 8.5–10.1)
CHLORIDE SERPL-SCNC: 105 MMOL/L (ref 98–107)
CKMB/CK RATIO: 1.9 (ref 0–3)
CKMB/CK RATIO: 2.6 (ref 0–3)
CO2 SERPL-SCNC: 24 MMOL/L (ref 21–32)
CREAT CL PREDICTED SERPL C-G-VRATE: 89.8 ML/MIN
CREAT SERPL-MCNC: 0.89 MG/DL (ref 0.6–1.4)
EOSINOPHIL NFR BLD AUTO: 188 K/UL (ref 130–400)
GLUCOSE SERPL-MCNC: 120 MG/DL (ref 70–99)
HCT VFR BLD CALC: 24.1 % (ref 42–52)
HCT VFR BLD CALC: 24.1 % (ref 42–52)
HCT VFR BLD CALC: 27.2 % (ref 42–52)
HCT VFR BLD CALC: 27.5 % (ref 42–52)
MAGNESIUM SERPL-MCNC: 2 MG/DL (ref 1.8–2.4)
MCH RBC QN AUTO: 30.6 PG (ref 25–34)
MCHC RBC AUTO-ENTMCNC: 34.4 G/DL (ref 32–36)
MCV RBC AUTO: 88.9 FL (ref 80–100)
NRBC BLD AUTO-RTO: 0.4 %
PMV BLD AUTO: 9.8 FL (ref 7.4–10.4)
POTASSIUM SERPL-SCNC: 3.5 MMOL/L (ref 3.5–5.1)
RBC # BLD AUTO: 2.71 M/UL (ref 4.7–6.1)
SODIUM SERPL-SCNC: 138 MMOL/L (ref 136–145)
WBC # BLD AUTO: 14.92 K/UL (ref 4.8–10.8)

## 2017-09-11 RX ADMIN — METHYLPREDNISOLONE SODIUM SUCCINATE SCH MLS/MIN: 1 INJECTION, POWDER, FOR SOLUTION INTRAMUSCULAR; INTRAVENOUS at 21:11

## 2017-09-11 RX ADMIN — Medication SCH MG: at 21:11

## 2017-09-11 RX ADMIN — MYCOPHENOLIC ACID SCH MG: 180 TABLET, DELAYED RELEASE ORAL at 21:11

## 2017-09-11 RX ADMIN — HYDRALAZINE HYDROCHLORIDE SCH MG: 10 TABLET ORAL at 14:26

## 2017-09-11 RX ADMIN — HYDRALAZINE HYDROCHLORIDE SCH MG: 10 TABLET ORAL at 07:20

## 2017-09-11 RX ADMIN — METOPROLOL TARTRATE SCH MG: 5 INJECTION, SOLUTION INTRAVENOUS at 03:19

## 2017-09-11 RX ADMIN — IPRATROPIUM BROMIDE AND ALBUTEROL SULFATE SCH ML: .5; 3 SOLUTION RESPIRATORY (INHALATION) at 11:15

## 2017-09-11 RX ADMIN — TACROLIMUS SCH MG: 0.5 CAPSULE ORAL at 21:11

## 2017-09-11 RX ADMIN — CLONIDINE HYDROCHLORIDE SCH MG: 0.1 TABLET ORAL at 07:20

## 2017-09-11 RX ADMIN — Medication SCH MG: at 07:20

## 2017-09-11 RX ADMIN — CLONIDINE HYDROCHLORIDE SCH MG: 0.1 TABLET ORAL at 21:11

## 2017-09-11 RX ADMIN — PANTOPRAZOLE SCH MG: 40 TABLET, DELAYED RELEASE ORAL at 21:11

## 2017-09-11 RX ADMIN — TACROLIMUS SCH MG: 1 CAPSULE ORAL at 07:27

## 2017-09-11 RX ADMIN — METOPROLOL TARTRATE SCH MG: 5 INJECTION, SOLUTION INTRAVENOUS at 07:19

## 2017-09-11 RX ADMIN — PANTOPRAZOLE SCH MG: 40 TABLET, DELAYED RELEASE ORAL at 07:20

## 2017-09-11 RX ADMIN — METOPROLOL TARTRATE SCH MG: 5 INJECTION, SOLUTION INTRAVENOUS at 14:26

## 2017-09-11 RX ADMIN — METOPROLOL TARTRATE SCH MG: 5 INJECTION, SOLUTION INTRAVENOUS at 12:14

## 2017-09-11 RX ADMIN — HYDRALAZINE HYDROCHLORIDE SCH MG: 10 TABLET ORAL at 21:11

## 2017-09-11 RX ADMIN — LIOTHYRONINE SODIUM SCH MCG: 25 TABLET ORAL at 07:20

## 2017-09-11 RX ADMIN — SERTRALINE HYDROCHLORIDE SCH MG: 100 TABLET, FILM COATED ORAL at 07:20

## 2017-09-11 RX ADMIN — AMLODIPINE BESYLATE SCH MG: 5 TABLET ORAL at 07:20

## 2017-09-11 RX ADMIN — METOPROLOL TARTRATE SCH MG: 5 INJECTION, SOLUTION INTRAVENOUS at 21:11

## 2017-09-11 RX ADMIN — IPRATROPIUM BROMIDE AND ALBUTEROL SULFATE SCH ML: .5; 3 SOLUTION RESPIRATORY (INHALATION) at 07:34

## 2017-09-11 RX ADMIN — METHYLPREDNISOLONE SODIUM SUCCINATE SCH MLS/MIN: 1 INJECTION, POWDER, FOR SOLUTION INTRAMUSCULAR; INTRAVENOUS at 05:42

## 2017-09-11 RX ADMIN — MYCOPHENOLIC ACID SCH MG: 180 TABLET, DELAYED RELEASE ORAL at 07:20

## 2017-09-11 RX ADMIN — METHYLPREDNISOLONE SODIUM SUCCINATE SCH MLS/MIN: 1 INJECTION, POWDER, FOR SOLUTION INTRAMUSCULAR; INTRAVENOUS at 14:26

## 2017-09-11 NOTE — FAMILY MEDICINE PROGRESS NOTE
Progress Note


Date of Service


Sep 11, 2017.





Subjective


Pt evaluation today including:  conversation w/ patient, physical exam, chart 

review, lab review, review of studies, conversation w/ consultant


Pt states he feels quite well, denying any recurrence of his chest sx.  Says 

his SOB and cough have improved since admit as well.  Last BM was yesterday, no 

blood / melena seen.  Denies any abd pain (says was never an issue).  Is 

tolerating his clear liquid diet without complaint.  No other acute c/o.





   Constitutional:  No fever, No chills


   Respiratory:  + cough, + shortness of breath (improved)


   Cardiovascular:  + chest pain (resolved)


   Abdomen:  No nausea, No vomiting, No diarrhea


   Neurologic:  No weakness





Medications





Current Inpatient Medications








 Medications


  (Trade)  Dose


 Ordered  Sig/Juanita


 Route  Start Time


 Stop Time Status Last Admin


Dose Admin


 


 Amlodipine


 Besylate


  (Norvasc Tab)  5 mg  DAILY


 PO  9/10/17 09:00


 10/10/17 08:59  9/11/17 07:20


5 MG


 


 Sertraline HCl


  (Zoloft Tab)  200 mg  DAILY


 PO  9/10/17 09:00


 10/10/17 08:59  9/11/17 07:20


200 MG


 


 Tacrolimus


  (Prograf Cap)  0.5 mg  QPM


 PO  9/9/17 21:00


 10/9/17 20:59  9/10/17 20:23


0.5 MG


 


 Tacrolimus


  (Prograf Cap)  1 mg  QAM


 PO  9/10/17 09:00


 10/10/17 08:59  9/11/17 07:27


1 MG


 


 Clonidine HCl


  (Catapres Tab)  0.2 mg  BID


 PO  9/9/17 21:00


 10/9/17 20:59  9/11/17 07:20


0.2 MG


 


 Magnesium Oxide


  (Mag-Ox Tab)  400 mg  BID


 PO  9/9/17 21:00


 10/9/17 20:59  9/11/17 07:20


400 MG


 


 Mycophenolate


 Sodium


  (Myfortic Tab)  360 mg  BID


 PO  9/9/17 21:00


 10/9/17 20:59  9/11/17 07:20


360 MG


 


 Methylprednisolone


 Sodium Succinate


 40 mg/Syringe  0.64 ml @ 


 1.5 mls/min  Q8


 IV  9/9/17 22:00


 10/9/17 21:59  9/11/17 14:26


1.5 MLS/MIN


 


 Acetaminophen


  (Tylenol Tab)  650 mg  Q4H  PRN


 PO  9/9/17 15:00


 10/9/17 14:59   


 


 


 Ondansetron HCl


  (Zofran Inj)  4 mg  Q6H  PRN


 IV  9/9/17 15:00


 10/9/17 14:59   


 


 


 Albuterol/


 Ipratropium


  (Duoneb)  3 ml  QIDR


 INH  9/9/17 16:00


 10/9/17 15:59 Future Hold 9/11/17 11:15


3 ML


 


 Bumetanide


  (Bumex Tab)  0.5 mg  QAM  PRN


 PO  9/9/17 15:45


 10/9/17 15:44   


 


 


 Albuterol/


 Ipratropium


  (Duoneb)  3 ml  Q2H  PRN


 INH  9/9/17 16:15


 10/9/17 16:14   


 


 


 Pantoprazole


 Sodium


  (Protonix Tab)  40 mg  BID


 PO  9/9/17 21:00


 10/9/17 20:59  9/11/17 07:20


40 MG


 


 Hydralazine HCl


  (Apresoline Tab)  10 mg  TID


 PO  9/10/17 14:00


 10/10/17 13:59  9/11/17 14:26


10 MG


 


 Liothyronine


 Sodium


  (Cytomel Tab)  25 mcg  DAILY


 PO  9/11/17 09:00


 10/11/17 08:59  9/11/17 07:20


25 MCG


 


 Nitroglycerin


  (Nitrostat Tab)  0.4 mg  PRN  PRN


 SL  9/10/17 17:15


 10/10/17 17:14   


 


 


 Morphine Sulfate


  (MoRPHine


 SULFATE INJ)  2 mg  Q2HWA  PRN


 IV  9/10/17 17:15


 9/24/17 17:14   


 


 


 Metoprolol


 Tartrate


  (Lopressor Iv)  5 mg  Q4


 IV.  9/10/17 20:00


 10/10/17 19:59  9/11/17 14:26


5 MG











Objective


Vital Signs











  Date Time  Temp Pulse Resp B/P (MAP) Pulse Ox O2 Delivery O2 Flow Rate FiO2


 


9/11/17 14:26  92  150/79    


 


9/11/17 12:14  69  150/79    


 


9/11/17 11:52 36.4 69 18 150/79 (102) 99 Room Air  


 


9/11/17 11:15  70 18  98 Room Air  


 


9/11/17 07:34  62 18  98 Room Air  


 


9/11/17 07:19  85  149/82    


 


9/11/17 07:08 36.5 67 18 149/85 (106) 99 Room Air  


 


9/11/17 03:19  86  137/82    


 


9/11/17 03:14 36.5 86 16 137/82 (100) 98 Room Air  


 


9/11/17 00:29 36.5 72 18 145/82 (103) 100 Room Air  


 


9/11/17 00:11  69  138/81 100   


 


9/10/17 23:39  71  134/80    


 


9/10/17 23:06 36.5 72  128/78 98   


 


9/10/17 22:56  74  127/75 98   


 


9/10/17 22:38  82  125/73 99   


 


9/10/17 20:21  105  152/81    


 


9/10/17 20:15 36.9 105 19 152/81 98   


 


9/10/17 19:45 36.7 96 19 122/78 99   


 


9/10/17 19:17 36.7 94 22 120/76 98   


 


9/10/17 19:09  98 18  99 Room Air  


 


9/10/17 18:50 37.2 91 18 125/78 99   


 


9/10/17 18:35 36.4 91 18 124/69 99   


 


9/10/17 17:28  92  138/76    


 


9/10/17 17:26  95  145/81    


 


9/10/17 17:24  117  177/88    


 


9/10/17 15:53 36.5 108 18 132/76 (94) 98 Room Air  











Physical Exam


General Appearance:  WD/WN (Pt preferred to stand throughout interview and exam)

, no apparent distress (speaking easily in full sentences without dyspnea)


Respiratory/Chest:  lungs clear, normal breath sounds


Cardiovascular:  regular rate, rhythm, no edema, + systolic murmur (2/6)


Abdomen:  normal bowel sounds, non tender, soft


Extremities:  + pertinent finding (fistula to RUE)


Neurologic/Psychiatric:  alert, normal mood/affect





Laboratory Results


9/11/17 06:47








9/11/17 14:00








9/11/17 06:47

















Test


  9/11/17


06:47


 


Red Blood Count


  2.71 M/uL


(4.7-6.1)


 


Mean Corpuscular Volume


  88.9 fL


()


 


Mean Corpuscular Hemoglobin


  30.6 pg


(25-34)


 


Mean Corpuscular Hemoglobin


Concent 34.4 g/dl


(32-36)


 


RDW Standard Deviation


  48.0 fL


(36.4-46.3)


 


RDW Coefficient of Variation


  18.0 %


(11.5-14.5)


 


Mean Platelet Volume


  9.8 fL


(7.4-10.4)


 


Nucleated RBC Absolute Count


(auto) 0.06 K/uL


(0-0)


 


Nucleated Red Blood Cells % 0.4 % 


 


Anion Gap


  9.0 mmol/L


(3-11)


 


Est Creatinine Clear Calc


Drug Dose 89.8 ml/min 


 


 


Estimated GFR (


American) 113.1 


 


 


Estimated GFR (Non-


American 97.6 


 


 


BUN/Creatinine Ratio 29.0 (10-20) 


 


Calcium Level


  9.5 mg/dl


(8.5-10.1)


 


Magnesium Level


  2.0 mg/dl


(1.8-2.4)


 


Total Creatine Kinase


  272 U/L


()


 


Creatine Kinase MB


  7.0 ng/ml


(0.5-3.6)


 


Creatine Kinase MB Ratio 2.6 (0-3.0) 


 


Troponin I


  0.035 ng/ml


(0-0.045)











[id number] Transthoracic echocardiogram: 


  *  There is mild concentric left ventricular hypertrophy.


  *  Left ventricular systolic function is normal.


  *  Grade I diastolic dysfunction, (abnormal relaxation pattern).


  *  The left atrium is mildly dilated.


  *  Mild aortic regurgitation.


  *  Mild valvular aortic stenosis.


  *  There is mild mitral regurgitation.


  *  Right ventricular systolic pressure is elevated at 30-40mmHg.


  *  Ejection Fraction = >70 %.





Assessment and Plan


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 

transplant x 3, HTN, hypothyroidism, depression and gout who presented/admitted 

via the ED on 09Sep with shortness of breath, non-productive cough, dizziness 

and melena.  Patient afebrile, VSS on arrival.  CXR negative for acute disease.

  WBC elevated 13.17.  Hgb 7.7.  Potassium 2.2.  Magnesium 1.3.  Cardiac 

enzymes negative.  EKG no ischemic changes.  





Acute blood loss / anemia / GI bleed: 09Sep Hb 7.7.  Transfused one unit PRBC 

on 09Sep, two additional units PRBC on 10Sep.  Iron and TIBC within normal 

limits.  Tolerating PO.  Hb trending upwards.  No reports of recurrence of 

melena.  


- H&H checks q6 hours initially.  Will transfer to q AM unless new event.  


- Held home aspirin and fish oil.  


- GI consulted 09Sep: "Most likely ASA induced PUD".  Planed EGD on Monday 11Sep put on hold due to cardiac event 10Sep.  New plan likely for EGD on Tues 

12Sep.  GI note for 11Sep pending (appreciate recs). 


- On protonix 40 mg PO BID.  





Chest tightness / unstable angina / ? NSTEMI: Noted on 10Sep, sudden onset.  B-

blocker given x 3, sx near-resolved.  NTG sublingual x1, sx totally resolved.  

Case discussed [facility] cardiology, planned medical management (thought is anemia 

as source due to supply-demand mismatch).  NSR on the monitor since event.  


- Cardiology consulted, appreciate recs.  From verbal discussions, pt is 

cleared for a potential EGD on 12Sep.  Please see their consult as well.  


- Cardiac echo obtained 11Sep (see notes).  


- Started metoprolol 5 mg IV q4h scheduled. 


- NTG prn (but pt has not needed any since 10Sep).  


- Previous note stated anticipate left heart cath in near future (once GI 

bleeding settled).  Will see what most recent cards recs are. 





SOB / Cough: Hx about a week prior to admit.  ? COPD (no known hx of prior 

pulmonary disease) or bronchitis component.  CXR clear.  CT chest clear.  

Stopped doxy (completed five days prior to admit).  Pt says since admit his 

cough has improved (was started on Duonebs QID and solumedrol TID).  Hx of 

occasional smoking perhaps 1/4 ppd at times.  


- Stopped Duonebs QID scheduled.  Kept Duonebs q2h prn for SOB or wheezing.


- Kept new (while inpt) Solumedrol 40 mg IV TID for now. 





Hypokalemia / Hypomagnesemia: K 2.2 and Mg 1.3 on admit.  Pt has hx of mag 

around 1.5 at home.  Pt c/o pain with IV KCl repletion, switched to PO.  Both 

improving.  


- Given KCl 20 mEq and Mag 2 grams on 09Sep.


- KCl 20 mEq IV q2h x 3 doses.  Is off KCl IVF on 11Sep.  Will continue to 

monitor.  


- Started magnesium 400 mg PO BID.  Will continue to monitor. 





ESRD secondary to alport syndrome, s/p renal txp x3: Baseline Cr around 1.  On 

admit Cr 1.1.  


- On Mycophenolate Sodium (Myfortic), 360 MG PO BID


- On Tacrolimus (Prograf), 0.5 MG PO QPM


- On Tacrolimus (Prograf), 1 MG PO QAM





PMH HTN: 


- Held Metoprolol Tartrate (Lopressor) 100 MG PO BID.  On metoprolol IV as 

above.  


- Started hydralazine 10 mg PO TID.  


- On Clonidine HCl (Clonidine HCl) 0.2 MG PO BID


- On Amlodipine (Norvasc) 5 MG PO DAILY


- Held amiloride and metolazone.  


- Bumex 0.5 mg PO q day prn for fluid overload.  





PMH Hypothyroidism: 


- Started synthroid 12.5 mcg IV q day.  





PMH Depression: 


- On Sertraline (Zoloft), 200 MG PO DAILY





PMH Gout: 


- Held Allopurinol (Allopurinol), 300 MG PO DAILY





DVT prophy: Holding rx due to bleeding.  SCD's ordered for while in bed, but pt 

prefers to stand when possible. 


Code status: Full resuscitation.  





Discussed all the above on attending rounds this morning.  RUBEN, PGY1 Family 

Medicine





Resident Tracking


Resident Involvement:  Resident Care Provided


Care Provided:  Adult Hospital Medicine (inpt rounds)





History


Resident Physician Supervision Note:





I was present with Dr. Chan during the history and exam. I discussed the 

case with the resident and agree with the findings and plan as documented in 

the note.  Any exceptions or clarifications are listed here.





Pt resting in chair at bedside. At present, no recurrence of chest pressure 

complaint from previous evening. Tolerating full liquid diet without abdominal 

complaint but without BM today (Amelanotic stool yesterday).


General Appearance:  WD/WN, no apparent distress


Respiratory:  chest non-tender, lungs clear, normal breath sounds, no 

respiratory distress


Cardiovascular:  normal peripheral pulses, regular rate, rhythm, systolic 

murmur (3/6SEM)


Gastrointestinal:  normal bowel sounds, non tender, soft, no organomegaly


Assessment/Plan


53 y/o male h/o ESRD, Alport syndrome (s/p renal xplant x 3), HTN, 

hypothyroidism, depression for GIB





Demand ischemia - cardiology aware, input appreciated - improved s/p transfusion

, nitro, B blocker


Anemia 2/2 GIB s/p 3U PRBC - trend H/H q8H, continue PPI drip, full liquids, 

NPO p MN


COPD exacerbation - continue prednisolone, albuterol/ipratropium, outpatient 

PFTs


Electrolyte disturbances - replete K/Mg previously - replete K+ PO if needed, 

monitor daily


ESRD secondary to Alport syndrome, s/p renal transplant x 3 - trend Cr daily, 

continue present regimen


HTN - continue lopressor, hydralazine, clonidine, amlodipine, bumex PRN


Hypothyroidism - continue Cytomel


Depression - continue sertraline


Gout - holding allopurinol

## 2017-09-11 NOTE — CARDIOLOGY CONSULTATION
Cardiology Consultation


Date of Consultation:


Sep 11, 2017.


Requesting Physician:


Dr. Fernandez


Attending Physician:


Dr. Kocher


Reason for Consultation:


St depression w/t elevated troponin


Pt evaluation today including:  conversation w/ patient, physical exam, chart 

review, lab review


History of Present Illness


51 y/o male with a history of ESRD secondary to Alport syndrome s/p renal 

transplant x 3, HTN, hypothyroidism, depression and gout who presented to the 

ED on 9/9 with shortness of breath, non-productive cough, dizziness and melena.





This AM he reports: bending down yesterday afternoon to grab something from the 

floor after coming from the bathroom and feeling out of breath along with a 

floating sensation in his chest. He did not feel good and those symptoms 

continued for about 25 minutes. He was evaluated by his inpatient team, had an 

EKG done which showed ST depression and he had mildly elevated troponin. His 

tachycardia was controlled with metoprolol and nitro. He reported feeling 

better after getting the metoprolol and getting transfused. He denies having 

any chest pain per say but was more out of breath. He denies any cardiac 

history aside from a murmur which he has always had. He reported being able to 

go to the gym running on the treadmill for about an hour and being able to mow 

his lawn without any trouble until his recent illness.





Past Medical/Surgical History


Alport syndrome





ESRD s/p renal transplant x 3.  Currently 1 working transplant kidney right side





HTN





Hypothyroidism





Depression





Gout





Family History


adopted





Social History


Smoking Status:  Current Some Day Smoker


History of Alcohol Use:  No





Review of Systems


Constitutional:  No fever


Respiratory:  No shortness of breath


Cardiac:  No chest pain


Patient has reported some dark stools recently which prompted his admission.


All Other Systems:  Reviewed and Negative





Allergies


Coded Allergies:  


     Sulfa Drugs (Verified  Allergy, Mild, RASH, 9/9/17)





Medications





Current Inpatient Medications








 Medications


  (Trade)  Dose


 Ordered  Sig/Juanita


 Route  Start Time


 Stop Time Status Last Admin


Dose Admin


 


 Amlodipine


 Besylate


  (Norvasc Tab)  5 mg  DAILY


 PO  9/10/17 09:00


 10/10/17 08:59  9/11/17 07:20


5 MG


 


 Sertraline HCl


  (Zoloft Tab)  200 mg  DAILY


 PO  9/10/17 09:00


 10/10/17 08:59  9/11/17 07:20


200 MG


 


 Tacrolimus


  (Prograf Cap)  0.5 mg  QPM


 PO  9/9/17 21:00


 10/9/17 20:59  9/10/17 20:23


0.5 MG


 


 Tacrolimus


  (Prograf Cap)  1 mg  QAM


 PO  9/10/17 09:00


 10/10/17 08:59  9/11/17 07:27


1 MG


 


 Clonidine HCl


  (Catapres Tab)  0.2 mg  BID


 PO  9/9/17 21:00


 10/9/17 20:59  9/11/17 07:20


0.2 MG


 


 Magnesium Oxide


  (Mag-Ox Tab)  400 mg  BID


 PO  9/9/17 21:00


 10/9/17 20:59  9/11/17 07:20


400 MG


 


 Mycophenolate


 Sodium


  (Myfortic Tab)  360 mg  BID


 PO  9/9/17 21:00


 10/9/17 20:59  9/11/17 07:20


360 MG


 


 Methylprednisolone


 Sodium Succinate


 40 mg/Syringe  0.64 ml @ 


 1.5 mls/min  Q8


 IV  9/9/17 22:00


 10/9/17 21:59  9/11/17 05:42


1.5 MLS/MIN


 


 Acetaminophen


  (Tylenol Tab)  650 mg  Q4H  PRN


 PO  9/9/17 15:00


 10/9/17 14:59   


 


 


 Ondansetron HCl


  (Zofran Inj)  4 mg  Q6H  PRN


 IV  9/9/17 15:00


 10/9/17 14:59   


 


 


 Albuterol/


 Ipratropium


  (Duoneb)  3 ml  QIDR


 INH  9/9/17 16:00


 10/9/17 15:59  9/11/17 07:34


3 ML


 


 Bumetanide


  (Bumex Tab)  0.5 mg  QAM  PRN


 PO  9/9/17 15:45


 10/9/17 15:44   


 


 


 Albuterol/


 Ipratropium


  (Duoneb)  3 ml  Q2H  PRN


 INH  9/9/17 16:15


 10/9/17 16:14   


 


 


 Pantoprazole


 Sodium


  (Protonix Tab)  40 mg  BID


 PO  9/9/17 21:00


 10/9/17 20:59  9/11/17 07:20


40 MG


 


 Hydralazine HCl


  (Apresoline Tab)  10 mg  TID


 PO  9/10/17 14:00


 10/10/17 13:59  9/11/17 07:20


10 MG


 


 Liothyronine


 Sodium


  (Cytomel Tab)  25 mcg  DAILY


 PO  9/11/17 09:00


 10/11/17 08:59  9/11/17 07:20


25 MCG


 


 Nitroglycerin


  (Nitrostat Tab)  0.4 mg  PRN  PRN


 SL  9/10/17 17:15


 10/10/17 17:14   


 


 


 Morphine Sulfate


  (MoRPHine


 SULFATE INJ)  2 mg  Q2HWA  PRN


 IV  9/10/17 17:15


 9/24/17 17:14   


 


 


 Metoprolol


 Tartrate


  (Lopressor Iv)  5 mg  Q4


 IV.  9/10/17 20:00


 10/10/17 19:59  9/11/17 07:19


5 MG











Physical Exam





Vital Signs Past 12 Hours








  Date Time  Temp Pulse Resp B/P (MAP) Pulse Ox O2 Delivery O2 Flow Rate FiO2


 


9/11/17 07:34  62 18  98 Room Air  


 


9/11/17 07:19  85  149/82    


 


9/11/17 07:08 36.5 67 18 149/85 (106) 99 Room Air  


 


9/11/17 03:19  86  137/82    


 


9/11/17 03:14 36.5 86 16 137/82 (100) 98 Room Air  


 


9/11/17 00:29 36.5 72 18 145/82 (103) 100 Room Air  


 


9/11/17 00:11  69  138/81 100   


 


9/10/17 23:39  71  134/80    


 


9/10/17 23:06 36.5 72  128/78 98   


 


9/10/17 22:56  74  127/75 98   








Head:  normocephalic, atraumatic


Neck:  pertinent finding (no JVD)


Lungs:  


   Respiratory effort:  good air movement


   Auscultation:  CTA except as noted


Cardiovascular:  


   Apical Impulse:  not displaced


   Heart Auscultation:  RRR, normal S1, normal S2, murmur (holosystolic murmur 

along mitral region)


Peripheral Pulses:  


   Bruits:  pertinent finding (fistulas with palpable bruits)


   Radial Pulse:  normal on the left, normal on the right


Extremities:  no edema


Neurologic:  


   Gait & Station:  pertinent finding (alert and oriented)





Data


Laboratory Results:





Last 24 Hours








Test


  9/10/17


14:08 9/10/17


17:25 9/11/17


01:15 9/11/17


06:47


 


Hemoglobin 7.8 g/dL   9.2 g/dL  8.3 g/dL 


 


Total Creatine Kinase  351 U/L  398 U/L  272 U/L 


 


Creatine Kinase MB  5.9 ng/ml  7.6 ng/ml  7.0 ng/ml 


 


Creatine Kinase MB Ratio  1.7  1.9  2.6 


 


Troponin I  0.048 ng/ml  0.047 ng/ml  0.035 ng/ml 


 


Hematocrit   27.5 %  24.1 % 


 


White Blood Count    14.92 K/uL 


 


Red Blood Count    2.71 M/uL 


 


Mean Corpuscular Volume    88.9 fL 


 


Mean Corpuscular Hemoglobin    30.6 pg 


 


Mean Corpuscular Hemoglobin


Concent 


  


  


  34.4 g/dl 


 


 


RDW Standard Deviation    48.0 fL 


 


RDW Coefficient of Variation    18.0 % 


 


Platelet Count    188 K/uL 


 


Mean Platelet Volume    9.8 fL 


 


Nucleated RBC Absolute Count


(auto) 


  


  


  0.06 K/uL 


 


 


Nucleated Red Blood Cells %    0.4 % 


 


Sodium Level    138 mmol/L 


 


Potassium Level    3.5 mmol/L 


 


Chloride Level    105 mmol/L 


 


Carbon Dioxide Level    24 mmol/L 


 


Anion Gap    9.0 mmol/L 


 


Blood Urea Nitrogen    26 mg/dl 


 


Creatinine    0.89 mg/dl 


 


Est Creatinine Clear Calc


Drug Dose 


  


  


  89.8 ml/min 


 


 


Estimated GFR (


American) 


  


  


  113.1 


 


 


Estimated GFR (Non-


American 


  


  


  97.6 


 


 


BUN/Creatinine Ratio    29.0 


 


Random Glucose    120 mg/dl 


 


Calcium Level    9.5 mg/dl 


 


Magnesium Level    2.0 mg/dl 








Imaging: 





EKG:  EKG at the time of his event revealed sinus tachycardia with ST segment 

depressions concerning for ischemia.  1 hour later the EKG was essentially 

normal with resolution of the ST segment changes.





Telemetry reviewed:  No significant arrhythmia





Echocardiogram performed today revealed some mild valvular heart disease with 

preserved LV systolic function





Assessment & Plan


A&P: 


51 y/o male with a history of ESRD secondary to Alport syndrome s/p renal 

transplant x 3, HTN, hypothyroidism, depression and gout who presented to the 

ED on 9/9 with shortness of breath, non-productive cough, dizziness and melena 

found to have severe anemia 2/2 GI bleed





Unstable angina/possible NSTEMI yesterday afternoon lasting about 30 minutes. 

EKG revealed ST depression with sinus tachycardia in the 110s during incident 

of sob/cp but improved to normal sinus in the 80s shortly thereafter s/p 

administration of oxygen, metoprolol, nitrates, and morphine prn (ASA given GI 

bleed).


Troponin mildly elevated 0.048 but came down to 0.035 this morning. Incident c/

w sob and cp resulting from supply/demand mismatch in light of severe anemia (

Hgb 7.2) especially given improvement with treatment and decrease in troponin 

post incident and transfusion (post transfusion Hgb 8.3 this AM)


-ECHO ordered to ensure adequate EF and rule out wall motion abnormalities


-Recommend controlling GI bleed to improve supply and demand mismatch. EGD 

being considered tomorrow with GI


-Consider outpatient follow up for stress test when stable


-Will follow patient and manage as needed








Attending note:  





Patient has no history of significant cardiac disease.  Generally speaking is 

an active individual who is able perform routine activity without limitations 

or symptoms of angina or coronary insufficiency.  Yesterday's event involve 

some symptoms associated with tachycardia dyspnea and in the setting of anemia.

  I suspect this represents some form of demand ischemia.  I do not believe he 

had a type 1 or plaque rupture event.  The appropriate treatment was 

transfusion.  He has preserved LV systolic function on his echocardiogram and 

overall biomarkers are really normal. I agree that he is stable for the 

proposed EGD.  Should he have additional symptoms once he is more active or if 

there is more objective evidence of ischemia we can proceed with such an 

evaluation.  Would be important for us to know ahead of time whether he has 

active bleeding as most therapies for coronary disease would involve some form 

of anti-platelet medication.

## 2017-09-11 NOTE — ECHOCARDIOGRAM REPORT
*NOTICE TO RECEIVING PARTY AGENCY**  This information is strictly Confidential and protected under 
[place] law.  [place] law prohibits you from making any further disclosure of this 
information unless further disclosure is expressly permitted by the written consent of the person to 
whom it pertains or is authorized by law.  A general authorization for the release of medical or 
other information is not sufficient for this purpose.  Hospital accepts no responsibility if the 
information is made available to any other person, INCLUDING THE PATIENT.



Interpretation Summary

  *  Name: MARY LOPEZ  Study Date: 2017 08:40 AM  BP: 149/82 mmHg

  *  MRN: [id number]  Patient Location: C.2T\S\S242\S\1  HR: 62

  *  : 1965 (M/d/yyyy)  Gender: Male  Height: 67 in

  *  Age: 52 yrs  Ethnicity: [place]  Weight: 143 lb

  *  Ordering Physician: Jamey Bland

  *  Performed By: Diane Becerra

  *  Accession# [id number]  Account# [id number]

  *  Reason For Study: ANGINA, ST DEPRESSION V4-6

  *  BSA: 1.8 m2

  *  -- Conclusions --

  *  There is mild concentric left ventricular hypertrophy.

  *  Left ventricular systolic function is normal.

  *  Grade I diastolic dysfunction, (abnormal relaxation pattern).

  *  The left atrium is mildly dilated.

  *  Mild aortic regurgitation.

  *  Mild valvular aortic stenosis.

  *  There is mild mitral regurgitation.

  *  Right ventricular systolic pressure is elevated at 30-40mmHg.

Procedure Details

  *  A complete two-dimensional transthoracic echocardiogram was performed (2D, M-mode, Doppler and 
color flow Doppler).

Left Ventricle

  *  The left ventricle is grossly normal size.

  *  There is mild concentric left ventricular hypertrophy.

  *  Ejection Fraction = >70 %.

  *  Left ventricular systolic function is normal.

  *  Grade I diastolic dysfunction, (abnormal relaxation pattern).

  *  The left ventricular wall motion is normal.

Right Ventricle

  *  The right ventricle is normal in size and function.

  *  The right ventricular systolic function is normal as assessed by tricuspid annular plane 
systolic excursion (TAPSE) (normal >1.5 cm).

Atria

  *  The left atrium is mildly dilated.

  *  Right atrial size is normal.

Mitral Valve

  *  The mitral valve anatomy is normal.

  *  There is mild mitral regurgitation.

Tricuspid Valve

  *  The tricuspid valve is not well visualized, but is grossly normal.

  *  There is mild tricuspid regurgitation.

  *  Right ventricular systolic pressure is elevated at 30-40mmHg.

Aortic Valve

  *  The aortic valve is normal in structure and function.

  *  Mild valvular aortic stenosis.

  *  Mild aortic regurgitation.

Pericardium/Pleural

  *  There is no pericardial effusion.



MMode 2D Measurements and Calculations

IVSd 1.3 cm

IVSs 2.1 cm



LVIDd 5.4 cm

LVIDs 2.7 cm

LVPWd 1.3 cm

LVPWs 2.4 cm



IVS/LVPW 1.1 

FS 49.6 %

EDV(Teich) 141.5 ml

ESV(Teich) 27.6 ml

EF(Teich) 80.5 %



EDV(cubed) 157.7 ml

ESV(cubed) 20.2 ml

EF(cubed) 87.2 %

% IVS thick 54.2 %

% LVPW thick 90.4 %





LV mass(C)d 295.2 grams

LV mass(C)dI 168.4 grams/m\S\2

LV mass(C)s 291.9 grams

LV mass(C)sI 166.5 grams/m\S\2



SV(Teich) 113.9 ml

SI(Teich) 65.0 ml/m\S\2

SV(cubed) 137.5 ml

SI(cubed) 78.4 ml/m\S\2



ACS 1.4 cm

LA dimension 4.3 cm



asc Aorta Diam 3.8 cm





LVOT diam 1.7 cm

LVOT area 2.3 cm\S\2



LVAd ap4 37.5 cm\S\2

LVLd ap4 8.8 cm

EDV(MOD-sp4) 133.0 ml

EDV(sp4-el) 136.2 ml

LVAs ap4 13.2 cm\S\2

LVLs ap4 6.8 cm

ESV(MOD-sp4) 22.5 ml

ESV(sp4-el) 21.9 ml

EF(MOD-sp4) 83.1 %

EF(sp4-el) 83.9 %



LVAd ap2 37.8 cm\S\2

LVLd ap2 8.7 cm

EDV(MOD-sp2) 140.2 ml

EDV(sp2-el) 139.6 ml

LVAs ap2 12.4 cm\S\2

LVLs ap2 5.7 cm

ESV(MOD-sp2) 23.6 ml

ESV(sp2-el) 23.0 ml

EF(MOD-sp2) 83.2 %

EF(sp2-el) 83.5 %



LVLd %diff -1.04 %

EDV(MOD-bp) 136.9 ml

LVLs %diff -19.55 %

ESV(MOD-bp) 24.3 ml

EF(MOD-bp) 82.3 %





SV(MOD-sp4) 110.5 ml

SI(MOD-sp4) 63.0 ml/m\S\2



SV(MOD-sp2) 116.6 ml

SI(MOD-sp2) 66.5 ml/m\S\2



SV(MOD-bp) 112.6 ml

SI(MOD-bp) 64.2 ml/m\S\2



SV(sp4-el) 114.3 ml

SI(sp4-el) 65.2 ml/m\S\2





SV(sp2-el) 116.6 ml

SI(sp2-el) 66.5 ml/m\S\2













Doppler Measurements and Calculations

MV E max priscila 97.1 cm/sec

MV A max priscila 110.0 cm/sec



MV E/A 0.88 



MV dec time 0.35 sec



Ao V2 max 277.0 cm/sec

Ao max PG 30.7 mmHg

Ao max PG (full) 15.2 mmHg

Ao V2 mean 158.5 cm/sec

Ao mean PG 12.9 mmHg

Ao V2 VTI 52.8 cm

ARNOLD(V,A) 1.6 cm\S\2

ARNOLD(V,D) 1.6 cm\S\2





AI max priscila 442.6 cm/sec

AI max PG 78.4 mmHg

AI dec slope 262.5 cm/sec\S\2

AI P1/2t 493.8 msec



LV V1 max PG 15.5 mmHg



LV V1 max 196.7 cm/sec



MR max priscila 556.1 cm/sec

MR max .9 mmHg





PA V2 max 124.0 cm/sec

PA max PG 6.2 mmHg



TR max priscila 292.8 cm/sec

## 2017-09-11 NOTE — MEDICAL CONSULT
Consultation


Date of Consultation:


Sep 11, 2017.


Attending Physician:


Gerson Urbano MD


Reason for Consultation:


Elevated troponin, St depression


History of Present Illness


52y/oM with





Past Medical/Surgical History


Medical Problems:


(1) Anemia


Status: Acute  





(2) GI bleed


Status: Acute  





(3) Hypokalemia


Status: Acute  





(4) Hypomagnesemia


Status: Acute  











Social History


Smoking Status:  Current Some Day Smoker


Smokeless Tobacco Use:  No


Alcohol Use:  none


Drug Use:  none


Marital Status:  


Housing Status:  lives with family


Occupation Status:  employed





Allergies


Coded Allergies:  


     Sulfa Drugs (Verified  Allergy, Mild, RASH, 9/9/17)





Current Inpatient Medications





Current Inpatient Medications








 Medications


  (Trade)  Dose


 Ordered  Sig/Juanita


 Route  Start Time


 Stop Time Status Last Admin


Dose Admin


 


 Amlodipine


 Besylate


  (Norvasc Tab)  5 mg  DAILY


 PO  9/10/17 09:00


 10/10/17 08:59  9/11/17 07:20


5 MG


 


 Sertraline HCl


  (Zoloft Tab)  200 mg  DAILY


 PO  9/10/17 09:00


 10/10/17 08:59  9/11/17 07:20


200 MG


 


 Tacrolimus


  (Prograf Cap)  0.5 mg  QPM


 PO  9/9/17 21:00


 10/9/17 20:59  9/10/17 20:23


0.5 MG


 


 Tacrolimus


  (Prograf Cap)  1 mg  QAM


 PO  9/10/17 09:00


 10/10/17 08:59  9/11/17 07:27


1 MG


 


 Clonidine HCl


  (Catapres Tab)  0.2 mg  BID


 PO  9/9/17 21:00


 10/9/17 20:59  9/11/17 07:20


0.2 MG


 


 Magnesium Oxide


  (Mag-Ox Tab)  400 mg  BID


 PO  9/9/17 21:00


 10/9/17 20:59  9/11/17 07:20


400 MG


 


 Mycophenolate


 Sodium


  (Myfortic Tab)  360 mg  BID


 PO  9/9/17 21:00


 10/9/17 20:59  9/11/17 07:20


360 MG


 


 Methylprednisolone


 Sodium Succinate


 40 mg/Syringe  0.64 ml @ 


 1.5 mls/min  Q8


 IV  9/9/17 22:00


 10/9/17 21:59  9/11/17 05:42


1.5 MLS/MIN


 


 Acetaminophen


  (Tylenol Tab)  650 mg  Q4H  PRN


 PO  9/9/17 15:00


 10/9/17 14:59   


 


 


 Ondansetron HCl


  (Zofran Inj)  4 mg  Q6H  PRN


 IV  9/9/17 15:00


 10/9/17 14:59   


 


 


 Albuterol/


 Ipratropium


  (Duoneb)  3 ml  QIDR


 INH  9/9/17 16:00


 10/9/17 15:59  9/11/17 07:34


3 ML


 


 Bumetanide


  (Bumex Tab)  0.5 mg  QAM  PRN


 PO  9/9/17 15:45


 10/9/17 15:44   


 


 


 Albuterol/


 Ipratropium


  (Duoneb)  3 ml  Q2H  PRN


 INH  9/9/17 16:15


 10/9/17 16:14   


 


 


 Pantoprazole


 Sodium


  (Protonix Tab)  40 mg  BID


 PO  9/9/17 21:00


 10/9/17 20:59  9/11/17 07:20


40 MG


 


 Hydralazine HCl


  (Apresoline Tab)  10 mg  TID


 PO  9/10/17 14:00


 10/10/17 13:59  9/11/17 07:20


10 MG


 


 Liothyronine


 Sodium


  (Cytomel Tab)  25 mcg  DAILY


 PO  9/11/17 09:00


 10/11/17 08:59  9/11/17 07:20


25 MCG


 


 Nitroglycerin


  (Nitrostat Tab)  0.4 mg  PRN  PRN


 SL  9/10/17 17:15


 10/10/17 17:14   


 


 


 Morphine Sulfate


  (MoRPHine


 SULFATE INJ)  2 mg  Q2HWA  PRN


 IV  9/10/17 17:15


 9/24/17 17:14   


 


 


 Metoprolol


 Tartrate


  (Lopressor Iv)  5 mg  Q4


 IV.  9/10/17 20:00


 10/10/17 19:59  9/11/17 07:19


5 MG











Physical Exam











  Date Time  Temp Pulse Resp B/P (MAP) Pulse Ox O2 Delivery O2 Flow Rate FiO2


 


9/11/17 07:34  62 18  98 Room Air  


 


9/11/17 07:19  85  149/82    


 


9/11/17 07:08 36.5 67 18 149/85 (106) 99 Room Air  


 


9/11/17 03:19  86  137/82    


 


9/11/17 03:14 36.5 86 16 137/82 (100) 98 Room Air  


 


9/11/17 00:29 36.5 72 18 145/82 (103) 100 Room Air  


 


9/11/17 00:11  69  138/81 100   


 


9/10/17 23:39  71  134/80    


 


9/10/17 23:06 36.5 72  128/78 98   


 


9/10/17 22:56  74  127/75 98   


 


9/10/17 22:38  82  125/73 99   


 


9/10/17 20:21  105  152/81    


 


9/10/17 20:15 36.9 105 19 152/81 98   


 


9/10/17 19:45 36.7 96 19 122/78 99   


 


9/10/17 19:17 36.7 94 22 120/76 98   


 


9/10/17 19:09  98 18  99 Room Air  


 


9/10/17 18:50 37.2 91 18 125/78 99   


 


9/10/17 18:35 36.4 91 18 124/69 99   


 


9/10/17 17:28  92  138/76    


 


9/10/17 17:26  95  145/81    


 


9/10/17 17:24  117  177/88    


 


9/10/17 15:53 36.5 108 18 132/76 (94) 98 Room Air  


 


9/10/17 15:30  102 18  98 Room Air  


 


9/10/17 13:25  85  137/65    


 


9/10/17 11:41 36.4 90 20 137/83 (101) 100 Room Air  











Laboratory Results





Last 24 Hours








Test


  9/10/17


14:08 9/10/17


17:25 9/11/17


01:15 9/11/17


06:47


 


Hemoglobin 7.8 g/dL   9.2 g/dL  8.3 g/dL 


 


Total Creatine Kinase  351 U/L  398 U/L  272 U/L 


 


Creatine Kinase MB  5.9 ng/ml  7.6 ng/ml  7.0 ng/ml 


 


Creatine Kinase MB Ratio  1.7  1.9  2.6 


 


Troponin I  0.048 ng/ml  0.047 ng/ml  0.035 ng/ml 


 


Hematocrit   27.5 %  24.1 % 


 


White Blood Count    14.92 K/uL 


 


Red Blood Count    2.71 M/uL 


 


Mean Corpuscular Volume    88.9 fL 


 


Mean Corpuscular Hemoglobin    30.6 pg 


 


Mean Corpuscular Hemoglobin


Concent 


  


  


  34.4 g/dl 


 


 


RDW Standard Deviation    48.0 fL 


 


RDW Coefficient of Variation    18.0 % 


 


Platelet Count    188 K/uL 


 


Mean Platelet Volume    9.8 fL 


 


Nucleated RBC Absolute Count


(auto) 


  


  


  0.06 K/uL 


 


 


Nucleated Red Blood Cells %    0.4 % 


 


Sodium Level    138 mmol/L 


 


Potassium Level    3.5 mmol/L 


 


Chloride Level    105 mmol/L 


 


Carbon Dioxide Level    24 mmol/L 


 


Anion Gap    9.0 mmol/L 


 


Blood Urea Nitrogen    26 mg/dl 


 


Creatinine    0.89 mg/dl 


 


Est Creatinine Clear Calc


Drug Dose 


  


  


  89.8 ml/min 


 


 


Estimated GFR (


American) 


  


  


  113.1 


 


 


Estimated GFR (Non-


American 


  


  


  97.6 


 


 


BUN/Creatinine Ratio    29.0 


 


Random Glucose    120 mg/dl 


 


Calcium Level    9.5 mg/dl 


 


Magnesium Level    2.0 mg/dl

## 2017-09-11 NOTE — PROGRESS NOTE
DATE: 09/11/2017

 

SUBJECTIVE:  The patient is having no chest pain.  His hemoglobin has risen

after 3 units of blood to a hemoglobin of 9.6.  He has had no further

bleeding and his troponin level has returned to normal.  His potassium is 3.5

today.

 

IMPRESSION:  The patient had melena and anemia on admission.  His aspirin is

being held.  We will schedule him for an EGD tomorrow afternoon with Dr. Lr for further evaluation.

## 2017-09-12 VITALS
DIASTOLIC BLOOD PRESSURE: 84 MMHG | TEMPERATURE: 97.7 F | SYSTOLIC BLOOD PRESSURE: 132 MMHG | HEART RATE: 81 BPM | OXYGEN SATURATION: 98 %

## 2017-09-12 VITALS
TEMPERATURE: 97.52 F | DIASTOLIC BLOOD PRESSURE: 73 MMHG | OXYGEN SATURATION: 97 % | HEART RATE: 62 BPM | SYSTOLIC BLOOD PRESSURE: 123 MMHG

## 2017-09-12 VITALS
DIASTOLIC BLOOD PRESSURE: 85 MMHG | SYSTOLIC BLOOD PRESSURE: 160 MMHG | TEMPERATURE: 97.52 F | HEART RATE: 61 BPM | OXYGEN SATURATION: 99 %

## 2017-09-12 VITALS
TEMPERATURE: 98.6 F | DIASTOLIC BLOOD PRESSURE: 66 MMHG | HEART RATE: 86 BPM | SYSTOLIC BLOOD PRESSURE: 91 MMHG | OXYGEN SATURATION: 95 %

## 2017-09-12 VITALS
SYSTOLIC BLOOD PRESSURE: 154 MMHG | DIASTOLIC BLOOD PRESSURE: 79 MMHG | TEMPERATURE: 98.42 F | OXYGEN SATURATION: 100 % | HEART RATE: 88 BPM

## 2017-09-12 VITALS
OXYGEN SATURATION: 96 % | HEART RATE: 60 BPM | DIASTOLIC BLOOD PRESSURE: 67 MMHG | TEMPERATURE: 97.7 F | SYSTOLIC BLOOD PRESSURE: 121 MMHG

## 2017-09-12 VITALS — OXYGEN SATURATION: 100 %

## 2017-09-12 VITALS
HEART RATE: 53 BPM | DIASTOLIC BLOOD PRESSURE: 74 MMHG | SYSTOLIC BLOOD PRESSURE: 135 MMHG | OXYGEN SATURATION: 100 % | TEMPERATURE: 97.52 F

## 2017-09-12 VITALS
HEART RATE: 67 BPM | DIASTOLIC BLOOD PRESSURE: 82 MMHG | TEMPERATURE: 97.88 F | OXYGEN SATURATION: 98 % | SYSTOLIC BLOOD PRESSURE: 136 MMHG

## 2017-09-12 VITALS — OXYGEN SATURATION: 100 % | HEART RATE: 88 BPM | TEMPERATURE: 98.42 F

## 2017-09-12 VITALS — OXYGEN SATURATION: 99 %

## 2017-09-12 LAB
ANION GAP SERPL CALC-SCNC: 8 MMOL/L (ref 3–11)
BUN SERPL-MCNC: 22 MG/DL (ref 7–18)
BUN/CREAT SERPL: 24.9 (ref 10–20)
CALCIUM SERPL-MCNC: 10.3 MG/DL (ref 8.5–10.1)
CHLORIDE SERPL-SCNC: 102 MMOL/L (ref 98–107)
CO2 SERPL-SCNC: 28 MMOL/L (ref 21–32)
CREAT CL PREDICTED SERPL C-G-VRATE: 91.8 ML/MIN
CREAT SERPL-MCNC: 0.87 MG/DL (ref 0.6–1.4)
EOSINOPHIL NFR BLD AUTO: 235 K/UL (ref 130–400)
GLUCOSE SERPL-MCNC: 94 MG/DL (ref 70–99)
HCT VFR BLD CALC: 28.5 % (ref 42–52)
MAGNESIUM SERPL-MCNC: 2.1 MG/DL (ref 1.8–2.4)
MCH RBC QN AUTO: 30.7 PG (ref 25–34)
MCHC RBC AUTO-ENTMCNC: 34.4 G/DL (ref 32–36)
MCV RBC AUTO: 89.3 FL (ref 80–100)
PMV BLD AUTO: 10 FL (ref 7.4–10.4)
POTASSIUM SERPL-SCNC: 3.5 MMOL/L (ref 3.5–5.1)
RBC # BLD AUTO: 3.19 M/UL (ref 4.7–6.1)
SODIUM SERPL-SCNC: 138 MMOL/L (ref 136–145)
WBC # BLD AUTO: 10.16 K/UL (ref 4.8–10.8)

## 2017-09-12 PROCEDURE — 0DB18ZX EXCISION OF UPPER ESOPHAGUS, VIA NATURAL OR ARTIFICIAL OPENING ENDOSCOPIC, DIAGNOSTIC: ICD-10-PCS | Performed by: SPECIALIST

## 2017-09-12 PROCEDURE — 0DB68ZX EXCISION OF STOMACH, VIA NATURAL OR ARTIFICIAL OPENING ENDOSCOPIC, DIAGNOSTIC: ICD-10-PCS | Performed by: SPECIALIST

## 2017-09-12 RX ADMIN — TACROLIMUS SCH MG: 1 CAPSULE ORAL at 08:15

## 2017-09-12 RX ADMIN — TACROLIMUS SCH MG: 0.5 CAPSULE ORAL at 20:33

## 2017-09-12 RX ADMIN — METOPROLOL TARTRATE SCH MG: 5 INJECTION, SOLUTION INTRAVENOUS at 04:00

## 2017-09-12 RX ADMIN — METHYLPREDNISOLONE SODIUM SUCCINATE SCH MLS/MIN: 1 INJECTION, POWDER, FOR SOLUTION INTRAMUSCULAR; INTRAVENOUS at 22:05

## 2017-09-12 RX ADMIN — METOPROLOL TARTRATE SCH MG: 5 INJECTION, SOLUTION INTRAVENOUS at 12:19

## 2017-09-12 RX ADMIN — HYDRALAZINE HYDROCHLORIDE SCH MG: 10 TABLET ORAL at 08:15

## 2017-09-12 RX ADMIN — PANTOPRAZOLE SCH MG: 40 TABLET, DELAYED RELEASE ORAL at 08:15

## 2017-09-12 RX ADMIN — METHYLPREDNISOLONE SODIUM SUCCINATE SCH MLS/MIN: 1 INJECTION, POWDER, FOR SOLUTION INTRAMUSCULAR; INTRAVENOUS at 06:03

## 2017-09-12 RX ADMIN — HYDRALAZINE HYDROCHLORIDE SCH MG: 10 TABLET ORAL at 20:31

## 2017-09-12 RX ADMIN — HYDRALAZINE HYDROCHLORIDE SCH MG: 10 TABLET ORAL at 14:31

## 2017-09-12 RX ADMIN — METOPROLOL TARTRATE SCH MG: 5 INJECTION, SOLUTION INTRAVENOUS at 08:16

## 2017-09-12 RX ADMIN — Medication SCH MG: at 20:32

## 2017-09-12 RX ADMIN — PANTOPRAZOLE SCH MG: 40 TABLET, DELAYED RELEASE ORAL at 20:33

## 2017-09-12 RX ADMIN — METOPROLOL TARTRATE SCH MG: 5 INJECTION, SOLUTION INTRAVENOUS at 16:00

## 2017-09-12 RX ADMIN — SERTRALINE HYDROCHLORIDE SCH MG: 100 TABLET, FILM COATED ORAL at 08:15

## 2017-09-12 RX ADMIN — MYCOPHENOLIC ACID SCH MG: 180 TABLET, DELAYED RELEASE ORAL at 08:15

## 2017-09-12 RX ADMIN — AMLODIPINE BESYLATE SCH MG: 5 TABLET ORAL at 08:15

## 2017-09-12 RX ADMIN — MYCOPHENOLIC ACID SCH MG: 180 TABLET, DELAYED RELEASE ORAL at 20:32

## 2017-09-12 RX ADMIN — Medication SCH MG: at 08:15

## 2017-09-12 RX ADMIN — METHYLPREDNISOLONE SODIUM SUCCINATE SCH MLS/MIN: 1 INJECTION, POWDER, FOR SOLUTION INTRAMUSCULAR; INTRAVENOUS at 14:31

## 2017-09-12 RX ADMIN — CLONIDINE HYDROCHLORIDE SCH MG: 0.1 TABLET ORAL at 20:31

## 2017-09-12 RX ADMIN — CLONIDINE HYDROCHLORIDE SCH MG: 0.1 TABLET ORAL at 08:15

## 2017-09-12 RX ADMIN — METOPROLOL TARTRATE SCH MG: 5 INJECTION, SOLUTION INTRAVENOUS at 00:43

## 2017-09-12 RX ADMIN — METOPROLOL TARTRATE SCH MG: 5 INJECTION, SOLUTION INTRAVENOUS at 20:26

## 2017-09-12 RX ADMIN — LIOTHYRONINE SODIUM SCH MCG: 25 TABLET ORAL at 08:15

## 2017-09-12 NOTE — GI REPORT
Procedure Date: 9/12/2017 2:58 PM

Procedure:            Upper GI endoscopy

Indications:          Melena, Suspected upper gastrointestinal bleeding

Medicines:            Propofol per Anesthesia

Complications:        No immediate complications. Estimated blood loss: 

                      Minimal.

Estimated Blood Loss: Estimated blood loss was minimal.

Procedure:            Pre-Anesthesia Assessment:

                      - Prior to the procedure, a History and Physical was 

                      performed, and patient medications and allergies were 

                      reviewed. The patient's tolerance of previous 

                      anesthesia was also reviewed. The risks and benefits of 

                      the procedure and the sedation options and risks were 

                      discussed with the patient. All questions were 

                      answered, and informed consent was obtained. Prior 

                      Anticoagulants: The patient has taken no previous 

                      anticoagulant or antiplatelet agents. ASA Grade 

                      Assessment: III - A patient with severe systemic 

                      disease. After reviewing the risks and benefits, the 

                      patient was deemed in satisfactory condition to undergo 

                      the procedure.

                      After obtaining informed consent, the endoscope was 

                      passed under direct vision. Throughout the procedure, 

                      the patient's blood pressure, pulse, and oxygen 

                      saturations were monitored continuously. The Scope was 

                      introduced through the mouth, and advanced to the 

                      fourth part of duodenum. The upper GI endoscopy was 

                      accomplished without difficulty. The patient tolerated 

                      the procedure well.

Findings:

     The upper third of the esophagus and middle third of the esophagus were 

     normal.

     The esophagus and gastroesophageal junction were examined with white 

     light. There were esophageal mucosal changes consistent with 

     short-segment Alonzo's esophagus. These changes involved the mucosa at 

     the upper extent of the gastric folds (37 cm from the incisors) 

     extending to the Z-line (34 cm from the incisors). Hiatal narrowing was 

     identified at 41 cm. The maximum longitudinal extent of these esophageal 

     mucosal changes was 3 cm in length. Mucosa was biopsied with a cold 

     forceps for histology. One specimen bottle was sent to pathology. 

     Estimated blood loss was minimal. Verification of patient identification 

     for the specimen was done by the physician and technician using the 

     patient's name and medical record number.

     Striped mildly erythematous mucosa without bleeding was found in the 

     gastric body and in the gastric antrum.

     A few localized, 2 mm non-bleeding erosions were found in the gastric 

     antrum. There were no stigmata of recent bleeding. Biopsies were taken 

     with a cold forceps for Helicobacter pylori testing. Verification of 

     patient identification for the specimen was done by the physician and 

     technician using the patient's name and medical record number.

     The examined duodenum was normal.

     A non-bleeding diverticulum was found in the second part of the duodenum.

     The cardia and gastric fundus were normal on retroflexion.

     Retained gastric contents are not identified on this exam.

Impression:           - Normal upper third of esophagus and middle third of 

                      esophagus.

                      - Esophageal mucosal changes consistent with 

                      short-segment Alonzo's esophagus. Biopsied.

                      - Erythematous mucosa in the gastric body and antrum.

                      - Non-bleeding erosive gastropathy. Biopsied.

                      - Normal examined duodenum.

                      - Non-bleeding duodenal diverticulum.

Recommendation:       - Return patient to hospital [facility] for ongoing care.

                      - Source of melena and anemia not fully explained by 

                      EGD findings today. Last colonoscopy appears to be 2010 

                      in [facility] however unclear if performed elsewhere. 

                      Consider colonoscopy if not recently performed. If 

                      bleeding returns consider tagged red cell scan.

MD Howard Prasad MD

9/12/2017 3:24:07 PM

This report has been signed electronically.

Note Initiated On: 9/12/2017 2:58 PM

     I attest to the content of the Intraoperative Record and orders 

     documented therein, exceptions below

## 2017-09-12 NOTE — GASTROENTEROLOGY PROGRESS NOTE
DATE: 09/12/2017

 

DATE:  09/12/2017  

 

SUBJECTIVE:  The patient underwent an upper endoscopy today with mild

superficial erosions in the  lower portion of the stomach, hiatal hernia, and

evidence of 3 cm Alonzo esophagus.  Biopsies were taken.  However,  a

specific source for the melena and anemia described is  not evident on upper

endoscopy.  Therefore, colonoscopy is recommended.  The patient last had one

by Dr. Lemons in 2010.  The patient is agreeable to undergo this today and

therefore bowel prep will begin this evening with colonoscopy tomorrow.  If

bleeding continues and particularly if no colonic source is found, then

either a small bowel capsule endoscopy as an outpatient would be prudent.  If

bleeding occurs during the hospitalization then a tagged red cell scan. 

Orders were placed n.p.o. after midnight except for meds.

## 2017-09-12 NOTE — ANESTHESIOLOGY PROGRESS NOTE
Anesthesia Post Op Note


Date & Time


Sep 12, 2017 at 15:30





Vital Signs


Pain Intensity:  0.0





Vital Signs Past 12 Hours








  Date Time  Temp Pulse Resp B/P (MAP) Pulse Ox O2 Delivery O2 Flow Rate FiO2


 


9/12/17 15:19  66 20 121/67 (85) 97 Room Air  


 


9/12/17 13:31 36.8 74 20 153/86 (108) 100 Room Air  


 


9/12/17 13:11 36.9 88 18 154/79 100 Nasal Cannula 2.0 


 


9/12/17 12:19 36.9 88 18  100   


 


9/12/17 12:19  62  154/79    


 


9/12/17 12:00      Room Air  


 


9/12/17 08:16  86  120/66    


 


9/12/17 08:02 37.0 86 18 120/66 (84) 95   


 


9/12/17 08:00      Room Air  


 


9/12/17 04:20 36.5 81 17 132/84 (100) 98 Room Air  


 


9/12/17 04:00  67  136/82    











Notes


Mental Status:  alert / awake / arousable, participated in evaluation


Pt Amnestic to Procedure:  Yes


Nausea / Vomiting:  adequately controlled


Pain:  adequately controlled


Airway Patency, RR, SpO2:  stable & adequate


BP & HR:  stable & adequate


Hydration State:  stable & adequate


Anesthetic Complications:  no major complications apparent

## 2017-09-12 NOTE — FAMILY MEDICINE PROGRESS NOTE
Progress Note


Date of Service


Sep 12, 2017.





Subjective


Pt evaluation today including:  conversation w/ patient, physical exam, chart 

review, lab review, review of studies


Pt says he's comfortable, no issues overnight.  No recurrence of CP, SOB, or 

melena (with small BM yesterday).  Denies any particular concerns.  Is NPO for 

planned EGD today.





   Constitutional:  No fever, No chills


   Respiratory:  No cough, No shortness of breath


   Cardiovascular:  No chest pain


   Abdomen:  No nausea, No vomiting, No diarrhea





Medications





Current Inpatient Medications








 Medications


  (Trade)  Dose


 Ordered  Sig/Juanita


 Route  Start Time


 Stop Time Status Last Admin


Dose Admin


 


 Amlodipine


 Besylate


  (Norvasc Tab)  5 mg  DAILY


 PO  9/10/17 09:00


 10/10/17 08:59  9/11/17 07:20


5 MG


 


 Sertraline HCl


  (Zoloft Tab)  200 mg  DAILY


 PO  9/10/17 09:00


 10/10/17 08:59  9/11/17 07:20


200 MG


 


 Tacrolimus


  (Prograf Cap)  0.5 mg  QPM


 PO  9/9/17 21:00


 10/9/17 20:59  9/11/17 21:11


0.5 MG


 


 Tacrolimus


  (Prograf Cap)  1 mg  QAM


 PO  9/10/17 09:00


 10/10/17 08:59  9/11/17 07:27


1 MG


 


 Clonidine HCl


  (Catapres Tab)  0.2 mg  BID


 PO  9/9/17 21:00


 10/9/17 20:59  9/11/17 21:11


0.2 MG


 


 Magnesium Oxide


  (Mag-Ox Tab)  400 mg  BID


 PO  9/9/17 21:00


 10/9/17 20:59  9/11/17 21:11


400 MG


 


 Mycophenolate


 Sodium


  (Myfortic Tab)  360 mg  BID


 PO  9/9/17 21:00


 10/9/17 20:59  9/11/17 21:11


360 MG


 


 Methylprednisolone


 Sodium Succinate


 40 mg/Syringe  0.64 ml @ 


 1.5 mls/min  Q8


 IV  9/9/17 22:00


 10/9/17 21:59  9/12/17 06:03


1.5 MLS/MIN


 


 Acetaminophen


  (Tylenol Tab)  650 mg  Q4H  PRN


 PO  9/9/17 15:00


 10/9/17 14:59   


 


 


 Ondansetron HCl


  (Zofran Inj)  4 mg  Q6H  PRN


 IV  9/9/17 15:00


 10/9/17 14:59   


 


 


 Albuterol/


 Ipratropium


  (Duoneb)  3 ml  QIDR


 INH  9/9/17 16:00


 10/9/17 15:59 Future Hold 9/11/17 11:15


3 ML


 


 Bumetanide


  (Bumex Tab)  0.5 mg  QAM  PRN


 PO  9/9/17 15:45


 10/9/17 15:44   


 


 


 Albuterol/


 Ipratropium


  (Duoneb)  3 ml  Q2H  PRN


 INH  9/9/17 16:15


 10/9/17 16:14   


 


 


 Pantoprazole


 Sodium


  (Protonix Tab)  40 mg  BID


 PO  9/9/17 21:00


 10/9/17 20:59  9/11/17 21:11


40 MG


 


 Hydralazine HCl


  (Apresoline Tab)  10 mg  TID


 PO  9/10/17 14:00


 10/10/17 13:59  9/11/17 21:11


10 MG


 


 Liothyronine


 Sodium


  (Cytomel Tab)  25 mcg  DAILY


 PO  9/11/17 09:00


 10/11/17 08:59  9/11/17 07:20


25 MCG


 


 Nitroglycerin


  (Nitrostat Tab)  0.4 mg  PRN  PRN


 SL  9/10/17 17:15


 10/10/17 17:14   


 


 


 Morphine Sulfate


  (MoRPHine


 SULFATE INJ)  2 mg  Q2HWA  PRN


 IV  9/10/17 17:15


 9/24/17 17:14   


 


 


 Metoprolol


 Tartrate


  (Lopressor Iv)  5 mg  Q4


 IV.  9/10/17 20:00


 10/10/17 19:59  9/12/17 04:00


5 MG











Objective


Physical Exam


General Appearance:  no apparent distress


Respiratory/Chest:  no respiratory distress, + wheezing (mild expiratory 

wheezing throughout)


Cardiovascular:  regular rate, rhythm, no edema, + systolic murmur (2/6)


Abdomen:  normal bowel sounds, non tender, soft


Extremities:  + pertinent finding (RLE fistula )





Laboratory Results


9/12/17 07:00

















Test


  9/12/17


07:00


 


Red Blood Count


  3.19 M/uL


(4.7-6.1)


 


Mean Corpuscular Volume


  89.3 fL


()


 


Mean Corpuscular Hemoglobin


  30.7 pg


(25-34)


 


Mean Corpuscular Hemoglobin


Concent 34.4 g/dl


(32-36)


 


RDW Standard Deviation


  54.1 fL


(36.4-46.3)


 


RDW Coefficient of Variation


  18.8 %


(11.5-14.5)


 


Mean Platelet Volume


  10.0 fL


(7.4-10.4)











BMP pending this AM





Assessment and Plan


51 y/o male with a history of ESRD secondary to Alport syndrome s/p renal 

transplant x 3, HTN, hypothyroidism, depression and gout who presented/admitted 

via the ED on 09Sep with shortness of breath, non-productive cough, dizziness 

and melena.  Patient afebrile, VSS on arrival.  CXR negative for acute disease.

  WBC elevated 13.17.  Hgb 7.7.  Potassium 2.2.  Magnesium 1.3.  Cardiac 

enzymes negative.  EKG no ischemic changes.  





Acute blood loss / anemia / GI bleed: 09Sep Hb 7.7.  Transfused one unit PRBC 

on 09Sep, two additional units PRBC on 10Sep.  Iron and TIBC within normal 

limits.  Tolerating PO.  Hb trending upwards.  No reports of recurrence of 

melena.  


- H&H checks q6 hours initially.  On q AM now unless new event.  


- Held home aspirin and fish oil.  


- GI consulted 09Sep: "Most likely ASA induced PUD".  EGD on track for 12Sep (

delayed by a day due to cardiac event).  


- On protonix 40 mg PO BID.  





Chest tightness / unstable angina / ? NSTEMI: Noted on 10Sep, sudden onset.  B-

blocker given x 3, sx near-resolved.  NTG sublingual x1, sx totally resolved.  

Case discussed [facility] cardiology, planned medical management (thought is anemia 

as source due to supply-demand mismatch).  NSR with occasional PVCs and rare 

bigeminy on the monitor since event.  


- Cardiology consulted, noted on 11Sep cardiac sx likely related to demand 

ischemia, doubted plaque rupture event.  Cleared for future (acute) EGD from 

their perspective. 


- Cardiac echo obtained 11Sep (see notes).  


- Started metoprolol 5 mg IV q4h scheduled. 


- NTG prn (but pt has not needed any since 10Sep).  


- Previous note stated anticipate left heart cath in near future (once GI 

bleeding settled).  





SOB / Cough: Hx about a week prior to admit.  ? COPD (no known hx of prior 

pulmonary disease) or bronchitis component.  CXR clear.  CT chest clear.  

Stopped doxy (completed five days prior to admit).  Pt says since admit his 

cough has improved (was started on Duonebs QID and solumedrol TID).  Hx of 

occasional smoking perhaps 1/4 ppd at times.  


- Stopped Duonebs QID scheduled.  Kept Duonebs q2h prn for SOB or wheezing.


- Kept new (while inpt) Solumedrol 40 mg IV TID for now. 





Hypokalemia / Hypomagnesemia: K 2.2 and Mg 1.3 on admit.  Pt has hx of mag 

around 1.5 at home.  Pt c/o pain with IV KCl repletion, switched to PO.  Both 

improving.  


- Given KCl 20 mEq and Mag 2 grams on 09Sep.


- KCl 20 mEq IV q2h x 3 doses.  Is off KCl IVF on 11Sep.  Will continue to 

monitor.  


- Started magnesium 400 mg PO BID.  Will continue to monitor. 





ESRD secondary to alport syndrome, s/p renal txp x3: Baseline Cr around 1.  On 

admit Cr 1.1.  


- On Mycophenolate Sodium (Myfortic), 360 MG PO BID


- On Tacrolimus (Prograf), 0.5 MG PO QPM


- On Tacrolimus (Prograf), 1 MG PO QAM





PMH HTN: 


- Held Metoprolol Tartrate (Lopressor) 100 MG PO BID.  On metoprolol IV as 

above.  


- Started hydralazine 10 mg PO TID.  


- On Clonidine HCl (Clonidine HCl) 0.2 MG PO BID


- On Amlodipine (Norvasc) 5 MG PO DAILY


- Held amiloride and metolazone.  


- Bumex 0.5 mg PO q day prn for fluid overload.  





PMH Hypothyroidism: 


- Started synthroid 12.5 mcg IV q day.  





PMH Depression: 


- On Sertraline (Zoloft), 200 MG PO DAILY





PMH Gout: 


- Held Allopurinol (Allopurinol), 300 MG PO DAILY





DVT prophy: Holding rx due to bleeding.  SCD's ordered for while in bed, but pt 

prefers to stand when possible. 


Code status: Full resuscitaiton.  





Will discuss all the above on attending rounds this morning.  RUBEN, PGY1 Family 

Medicine





Resident Tracking


Resident Involvement:  Resident Care Provided


Care Provided:  Adult Hospital Medicine (inpt rounds)





History


Resident Physician Supervision Note:





I was present with Dr. Chan during the history and exam. I discussed the 

case with the resident and agree with the findings and plan as documented in 

the note.  Any exceptions or clarifications are listed here.





Pt tolerated EGD well and reports amelanotic stool with last BM. Up and out of 

bed without lightheadedness, and reports no chest pain, SOB, palpitations or 

diffuse weakness. Prepping for colonoscopy at this time.


General Appearance:  WD/WN, no apparent distress


Respiratory:  chest non-tender, lungs clear, normal breath sounds, no 

respiratory distress


Cardiovascular:  normal peripheral pulses, regular rate, rhythm, no edema, no 

murmur (notable on standing exam, previously 3/6 RAYSA resting)


Gastrointestinal:  normal bowel sounds, non tender, soft, no organomegaly


Assessment/Plan


51 y/o male h/o ESRD, Alport syndrome (s/p renal xplant x 3), HTN, 

hypothyroidism, depression for GIB





Anemia 2/2 GIB s/p 3U PRBC - trend H/H in AM (more frequently if sx warrant), 

continue PPI, full liquids, prepping for colonoscopy tomorrow


Demand ischemia - cardiology aware, input appreciated - improved s/p transfusion

, nitro, B blocker - no recurrence


COPD exacerbation - continue prednisolone, albuterol/ipratropium, outpatient 

PFTs


Electrolyte disturbances - replete K/Mg previously - replete K+ PO if needed, 

monitor daily


ESRD secondary to Alport syndrome, s/p renal transplant x 3 - trend Cr daily, 

continue present regimen


HTN - continue lopressor, hydralazine, clonidine, amlodipine, bumex PRN


Hypothyroidism - continue Cytomel


Depression - continue sertraline


Gout - holding allopurinol

## 2017-09-13 VITALS
DIASTOLIC BLOOD PRESSURE: 63 MMHG | SYSTOLIC BLOOD PRESSURE: 131 MMHG | HEART RATE: 88 BPM | TEMPERATURE: 98.6 F | OXYGEN SATURATION: 96 %

## 2017-09-13 VITALS
DIASTOLIC BLOOD PRESSURE: 78 MMHG | HEART RATE: 67 BPM | TEMPERATURE: 97.52 F | SYSTOLIC BLOOD PRESSURE: 144 MMHG | OXYGEN SATURATION: 97 %

## 2017-09-13 VITALS
OXYGEN SATURATION: 98 % | SYSTOLIC BLOOD PRESSURE: 152 MMHG | HEART RATE: 64 BPM | TEMPERATURE: 98.42 F | DIASTOLIC BLOOD PRESSURE: 85 MMHG

## 2017-09-13 VITALS
HEART RATE: 66 BPM | SYSTOLIC BLOOD PRESSURE: 127 MMHG | OXYGEN SATURATION: 98 % | TEMPERATURE: 98.24 F | DIASTOLIC BLOOD PRESSURE: 69 MMHG

## 2017-09-13 VITALS
SYSTOLIC BLOOD PRESSURE: 131 MMHG | OXYGEN SATURATION: 96 % | HEART RATE: 88 BPM | TEMPERATURE: 98.6 F | DIASTOLIC BLOOD PRESSURE: 63 MMHG

## 2017-09-13 VITALS
DIASTOLIC BLOOD PRESSURE: 85 MMHG | SYSTOLIC BLOOD PRESSURE: 152 MMHG | TEMPERATURE: 98.42 F | HEART RATE: 64 BPM | OXYGEN SATURATION: 98 %

## 2017-09-13 VITALS
DIASTOLIC BLOOD PRESSURE: 69 MMHG | SYSTOLIC BLOOD PRESSURE: 127 MMHG | TEMPERATURE: 98.24 F | HEART RATE: 66 BPM | OXYGEN SATURATION: 98 %

## 2017-09-13 LAB
ANION GAP SERPL CALC-SCNC: 7 MMOL/L (ref 3–11)
BUN SERPL-MCNC: 26 MG/DL (ref 7–18)
BUN/CREAT SERPL: 32.8 (ref 10–20)
CALCIUM SERPL-MCNC: 9.4 MG/DL (ref 8.5–10.1)
CHLORIDE SERPL-SCNC: 103 MMOL/L (ref 98–107)
CO2 SERPL-SCNC: 29 MMOL/L (ref 21–32)
CREAT CL PREDICTED SERPL C-G-VRATE: 101.1 ML/MIN
CREAT SERPL-MCNC: 0.79 MG/DL (ref 0.6–1.4)
EOSINOPHIL NFR BLD AUTO: 205 K/UL (ref 130–400)
GLUCOSE SERPL-MCNC: 104 MG/DL (ref 70–99)
HCT VFR BLD CALC: 28.8 % (ref 42–52)
MAGNESIUM SERPL-MCNC: 2 MG/DL (ref 1.8–2.4)
MCH RBC QN AUTO: 30.4 PG (ref 25–34)
MCHC RBC AUTO-ENTMCNC: 34 G/DL (ref 32–36)
MCV RBC AUTO: 89.4 FL (ref 80–100)
PMV BLD AUTO: 9.6 FL (ref 7.4–10.4)
POTASSIUM SERPL-SCNC: 3.5 MMOL/L (ref 3.5–5.1)
RBC # BLD AUTO: 3.22 M/UL (ref 4.7–6.1)
SODIUM SERPL-SCNC: 139 MMOL/L (ref 136–145)
WBC # BLD AUTO: 5.26 K/UL (ref 4.8–10.8)

## 2017-09-13 PROCEDURE — 0DJD8ZZ INSPECTION OF LOWER INTESTINAL TRACT, VIA NATURAL OR ARTIFICIAL OPENING ENDOSCOPIC: ICD-10-PCS | Performed by: SPECIALIST

## 2017-09-13 RX ADMIN — METOPROLOL TARTRATE SCH MG: 5 INJECTION, SOLUTION INTRAVENOUS at 12:17

## 2017-09-13 RX ADMIN — LIOTHYRONINE SODIUM SCH MCG: 25 TABLET ORAL at 08:42

## 2017-09-13 RX ADMIN — HYDRALAZINE HYDROCHLORIDE SCH MG: 10 TABLET ORAL at 13:41

## 2017-09-13 RX ADMIN — Medication SCH MG: at 08:43

## 2017-09-13 RX ADMIN — METOPROLOL TARTRATE SCH MG: 5 INJECTION, SOLUTION INTRAVENOUS at 08:42

## 2017-09-13 RX ADMIN — METOPROLOL TARTRATE SCH MG: 5 INJECTION, SOLUTION INTRAVENOUS at 00:37

## 2017-09-13 RX ADMIN — CLONIDINE HYDROCHLORIDE SCH MG: 0.1 TABLET ORAL at 08:43

## 2017-09-13 RX ADMIN — AMLODIPINE BESYLATE SCH MG: 5 TABLET ORAL at 08:44

## 2017-09-13 RX ADMIN — MYCOPHENOLIC ACID SCH MG: 180 TABLET, DELAYED RELEASE ORAL at 08:44

## 2017-09-13 RX ADMIN — METHYLPREDNISOLONE SODIUM SUCCINATE SCH MLS/MIN: 1 INJECTION, POWDER, FOR SOLUTION INTRAMUSCULAR; INTRAVENOUS at 04:48

## 2017-09-13 RX ADMIN — PANTOPRAZOLE SCH MG: 40 TABLET, DELAYED RELEASE ORAL at 08:43

## 2017-09-13 RX ADMIN — METOPROLOL TARTRATE SCH MG: 5 INJECTION, SOLUTION INTRAVENOUS at 16:45

## 2017-09-13 RX ADMIN — METOPROLOL TARTRATE SCH MG: 5 INJECTION, SOLUTION INTRAVENOUS at 04:48

## 2017-09-13 RX ADMIN — TACROLIMUS SCH MG: 1 CAPSULE ORAL at 08:42

## 2017-09-13 RX ADMIN — METHYLPREDNISOLONE SODIUM SUCCINATE SCH MLS/MIN: 1 INJECTION, POWDER, FOR SOLUTION INTRAMUSCULAR; INTRAVENOUS at 13:41

## 2017-09-13 RX ADMIN — HYDRALAZINE HYDROCHLORIDE SCH MG: 10 TABLET ORAL at 08:43

## 2017-09-13 RX ADMIN — SERTRALINE HYDROCHLORIDE SCH MG: 100 TABLET, FILM COATED ORAL at 08:43

## 2017-09-13 NOTE — DISCHARGE INSTRUCTIONS
Discharge Instructions


Date of Service


Sep 13, 2017.





Admission


Reason for Admission:  Symptomatic Anemia





Discharge


Discharge Diagnosis / Problem:  Gi Bleed





Discharge Goals


Goal(s):  Diagnostic testing, Prevent Disease Progression





Activity Recommendations


Activity Limitations:  as noted below


Lifting Limitations:  gradually increase as tolerated


Exercise/Sports Limitations:  gradually increase as tolerated


May Resume Sexual Activity:  when tolerated


Driving or Machine Use:  resume 1 day after discharge





.





Instructions / Follow-Up


Instructions / Follow-Up


Mr. Marcelo,





-You were admitted due to GI bleed. You were transfused 3 units after which 

your blood counts stabilized. You also underwent an EGD and Colonoscopy that 

did not find a source. As a result, you will be following up with GI outpatient 

for a capsule endoscopy (they will schedule). Please do not take aspirin or 

fish oil until further follow up with your PCP. 





-While here, you also experienced some chest pain likely as a result of your 

blood loss/anemia. You were treated with nitroglycerin and beta blocker after 

which your symptoms resolved. No recurrence of this after. You may need a 

stress test in the future; please discuss with your PCP





- You also had some shortness of breath/cough that could be a result of 

underlying lung disease such as COPD (from smoking). Imaging was normal. You 

were treated with breathing treatments and steroids and this seemed to improve 

your breathing. Please continue to take oral prednisone for 2 more days. We 

have also given you a presciption for an inhaler that you may use if you have 

shortness of breath/wheezing. We also recommend pulmonary function testing 

outpatient for a definitive diagnosis. 





- Please follow up with Dr. Monahan within 1 week and With Gastroenterology (

will be scheduled). You will need repeat blood work by Friday, Sept 15th (H/H 

as well as Electrolytes, Magnesium)





- Full liquid diet tonight and if tolerating, you may transition to a regular 

diet. 





- If you experience any further shortness of breath, chest pain, rectal 

bleeding or other concerns, please call 911/come back to the ER. 





Thank you





Current Hospital Diet


Patient's current hospital diet: Full Liquid Diet





Discharge Diet


Recommended Diet:  Full Liquid Diet





Procedures


Procedures Performed:  


Endoscopy, Colonoscopy





Pending Studies


Studies pending at discharge:  no





Medical Emergencies








.


Who to Call and When:





Medical Emergencies:  If at any time you feel your situation is an emergency, 

please call 911 immediately.





.





Non-Emergent Contact


Non-Emergency issues call your:  Primary Care Provider, Gastroenterologist





.


.








"Provider Documentation" section prepared by Judith Purcell.








.





VTE Core Measure


Inpt VTE Proph given/why not?:  SCD's, Contraindicated

## 2017-09-13 NOTE — GI REPORT
Procedure Date: 9/13/2017 2:46 PM

Procedure:            Colonoscopy

Indications:          Melena

Medicines:            Propofol per Anesthesia

Complications:        No immediate complications. Estimated blood loss: None.

Estimated Blood Loss: Estimated blood loss: none.

Procedure:            Pre-Anesthesia Assessment:

                      - Prior to the procedure, a History and Physical was 

                      performed, and patient medications and allergies were 

                      reviewed. The patient's tolerance of previous 

                      anesthesia was also reviewed. The risks and benefits of 

                      the procedure and the sedation options and risks were 

                      discussed with the patient. All questions were 

                      answered, and informed consent was obtained. Prior 

                      Anticoagulants: The patient has taken no previous 

                      anticoagulant or antiplatelet agents. ASA Grade 

                      Assessment: III - A patient with severe systemic 

                      disease. After reviewing the risks and benefits, the 

                      patient was deemed in satisfactory condition to undergo 

                      the procedure.

                      After I obtained informed consent, the scope was passed 

                      under direct vision. Throughout the procedure, the 

                      patient's blood pressure, pulse, and oxygen saturations 

                      were monitored continuously. The scope was introduced 

                      through the anus and advanced to the terminal ileum, 

                      with identification of the appendiceal orifice and IC 

                      valve. The colonoscopy was performed without 

                      difficulty. The patient tolerated the procedure well. 

                      The quality of the bowel preparation was fair.

Findings:

     The perianal and digital rectal examinations were normal. Pertinent 

     negatives include normal sphincter tone, no palpable rectal lesions and 

     no anal lesion or abnormality was detected.

     The terminal ileum appeared normal.

     Many small-mouthed diverticula were found in the sigmoid colon.

     The exam was otherwise without abnormality.

     Non-bleeding internal hemorrhoids were found during retroflexion. The 

     hemorrhoids were mild.

Impression:           - The examined portion of the ileum was normal.

                      - Diverticulosis in the sigmoid colon.

                      - The examination was otherwise normal.

                      - Non-bleeding internal hemorrhoids.

                      - No specimens collected.

Recommendation:       - Return patient to hospital [facility]osta for ongoing care.

                      - Advance diet as tolerated.

                      - Continue present medications.

                      - Consider outpt SB capsule to exclude bleeding source 

                      . tagged cell scan if bleeding returns.

MD Howard Prasad MD

9/13/2017 3:30:35 PM

This report has been signed electronically.

Note Initiated On: 9/13/2017 2:46 PM

     I attest to the content of the Intraoperative Record and orders 

     documented therein, exceptions below

## 2017-09-13 NOTE — FAMILY MEDICINE PROGRESS NOTE
Progress Note


Date of Service


Sep 13, 2017.





Subjective


Pt evaluation today including:  conversation w/ patient, physical exam, chart 

review, lab review


Says overall he "feels good".  Has been NPO for planned colonoscopy today.  

Denies any recurrence of chest pain, SOB, cough, wheezing.  Says had small BM 

yesterday, not dark.  No acute concerns or noted overnight events.





   Constitutional:  No fever, No chills


   Respiratory:  No cough, No shortness of breath


   Cardiovascular:  No chest pain, No edema


   Abdomen:  No pain, No nausea, No vomiting, No diarrhea





Medications





Current Inpatient Medications








 Medications


  (Trade)  Dose


 Ordered  Sig/Juanita


 Route  Start Time


 Stop Time Status Last Admin


Dose Admin


 


 Amlodipine


 Besylate


  (Norvasc Tab)  5 mg  DAILY


 PO  9/10/17 09:00


 10/10/17 08:59  9/12/17 08:15


5 MG


 


 Sertraline HCl


  (Zoloft Tab)  200 mg  DAILY


 PO  9/10/17 09:00


 10/10/17 08:59  9/12/17 08:15


200 MG


 


 Tacrolimus


  (Prograf Cap)  0.5 mg  QPM


 PO  9/9/17 21:00


 10/9/17 20:59  9/12/17 20:33


0.5 MG


 


 Tacrolimus


  (Prograf Cap)  1 mg  QAM


 PO  9/10/17 09:00


 10/10/17 08:59  9/12/17 08:15


1 MG


 


 Clonidine HCl


  (Catapres Tab)  0.2 mg  BID


 PO  9/9/17 21:00


 10/9/17 20:59  9/12/17 20:31


0.2 MG


 


 Magnesium Oxide


  (Mag-Ox Tab)  400 mg  BID


 PO  9/9/17 21:00


 10/9/17 20:59  9/12/17 20:32


400 MG


 


 Mycophenolate


 Sodium


  (Myfortic Tab)  360 mg  BID


 PO  9/9/17 21:00


 10/9/17 20:59  9/12/17 20:32


360 MG


 


 Methylprednisolone


 Sodium Succinate


 40 mg/Syringe  0.64 ml @ 


 1.5 mls/min  Q8


 IV  9/9/17 22:00


 10/9/17 21:59  9/13/17 04:48


1.5 MLS/MIN


 


 Acetaminophen


  (Tylenol Tab)  650 mg  Q4H  PRN


 PO  9/9/17 15:00


 10/9/17 14:59   


 


 


 Ondansetron HCl


  (Zofran Inj)  4 mg  Q6H  PRN


 IV  9/9/17 15:00


 10/9/17 14:59   


 


 


 Albuterol/


 Ipratropium


  (Duoneb)  3 ml  QIDR


 INH  9/9/17 16:00


 10/9/17 15:59 Future Hold 9/11/17 11:15


3 ML


 


 Bumetanide


  (Bumex Tab)  0.5 mg  QAM  PRN


 PO  9/9/17 15:45


 10/9/17 15:44   


 


 


 Albuterol/


 Ipratropium


  (Duoneb)  3 ml  Q2H  PRN


 INH  9/9/17 16:15


 10/9/17 16:14   


 


 


 Pantoprazole


 Sodium


  (Protonix Tab)  40 mg  BID


 PO  9/9/17 21:00


 10/9/17 20:59  9/12/17 20:33


40 MG


 


 Hydralazine HCl


  (Apresoline Tab)  10 mg  TID


 PO  9/10/17 14:00


 10/10/17 13:59  9/12/17 20:31


10 MG


 


 Liothyronine


 Sodium


  (Cytomel Tab)  25 mcg  DAILY


 PO  9/11/17 09:00


 10/11/17 08:59  9/12/17 08:15


25 MCG


 


 Nitroglycerin


  (Nitrostat Tab)  0.4 mg  PRN  PRN


 SL  9/10/17 17:15


 10/10/17 17:14   


 


 


 Morphine Sulfate


  (MoRPHine


 SULFATE INJ)  2 mg  Q2HWA  PRN


 IV  9/10/17 17:15


 9/24/17 17:14   


 


 


 Metoprolol


 Tartrate


  (Lopressor Iv)  5 mg  Q4


 IV.  9/10/17 20:00


 10/10/17 19:59  9/13/17 04:48


5 MG











Objective


Vital Signs











  Date Time  Temp Pulse Resp B/P (MAP) Pulse Ox O2 Delivery O2 Flow Rate FiO2


 


9/13/17 04:48  67  144/78    


 


9/13/17 04:42      Room Air  


 


9/13/17 04:05 36.4 67 17 144/78 (100) 97 Room Air  


 


9/13/17 00:37  62  123/73    


 


9/13/17 00:32      Room Air  


 


9/12/17 23:43 36.4 62 17 123/73 (90) 97 Room Air  


 


9/12/17 20:26  66  160/85    


 


9/12/17 20:00     99 Room Air  


 


9/12/17 19:37 36.4 61 22 160/85 (110) 99 Room Air  


 


9/12/17 16:31 36.5 60 22 121/67 (85) 96 Room Air  


 


9/12/17 16:10 36.4 53 18 135/74 (94) 100 Room Air  


 


9/12/17 16:00     100 Room Air  


 


9/12/17 16:00  56  135/74    


 


9/12/17 15:49  56 20 127/75 (92) 99 Room Air  


 


9/12/17 15:34  67 20 129/76 (93) 98 Room Air  


 


9/12/17 15:19  66 20 121/67 (85) 97 Room Air  


 


9/12/17 13:31 36.8 74 20 153/86 (108) 100 Room Air  


 


9/12/17 13:11 36.9 88 18 154/79 100 Nasal Cannula 2.0 


 


9/12/17 12:19 36.9 88 18  100   


 


9/12/17 12:19  62  154/79    


 


9/12/17 12:00      Room Air  


 


9/12/17 08:16  86  120/66    


 


9/12/17 08:02 37.0 86 18 120/66 (84) 95   


 


9/12/17 08:00      Room Air  











Physical Exam


General Appearance:  no apparent distress


Respiratory/Chest:  lungs clear, + wheezing (mild expiratory wheezing)


Cardiovascular:  regular rate, rhythm, no edema, + systolic murmur


Abdomen:  normal bowel sounds, non tender, soft


Extremities:  + pedal edema (minimal (B) ankle edema), + pertinent finding (RUE 

fistula)





Laboratory Results











Test


  9/13/17


07:22











Assessment and Plan


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 

transplant x 3, HTN, hypothyroidism, depression and gout who presented/admitted 

via the ED on 09Sep with shortness of breath, non-productive cough, dizziness 

and melena.  Patient afebrile, VSS on arrival.  CXR negative for acute disease.

  WBC elevated 13.17.  Hgb 7.7.  Potassium 2.2.  Magnesium 1.3.  Cardiac 

enzymes negative.  EKG no ischemic changes.  





Acute blood loss / anemia / GI bleed: 09Sep Hb 7.7.  Transfused one unit PRBC 

on 09Sep, two additional units PRBC on 10Sep.  Iron and TIBC within normal 

limits.  Tolerating PO.  Hb trending upwards.  No reports of recurrence of 

melena.  GI consulted 09Sep.  EGD performed on 12Sep which noted no specific 

source of melena or anemia.  


- Held home aspirin and fish oil.  


- On protonix 40 mg PO BID.  


[ ] On track for colonoscopy via GI today (13Sep).  





Chest tightness / unstable angina: Noted on 10Sep, sudden onset.  B-blocker 

given x 3, sx near-resolved.  NTG sublingual x1, sx totally resolved.  Case 

discussed [facility] cardiology.  Believe event was due to supply-demand mismatch 

because of anemia, now corrected.  S/p this event no recurrence of CP.  NSR 

with occasional PVCs and rare bigeminy on the monitor since event.  Cardiac 

echo on 11Sep noted EF >70%, normal LVSF, and elevated RVSP at 30-40.  


- Started metoprolol 5 mg IV q4h scheduled. 


- NTG prn (but pt has not needed any since 10Sep).  


- Previous note stated anticipate left heart cath in near future (once GI 

bleeding settled).  





SOB / Cough: Hx about a week prior to admit.  ? COPD (no known hx of prior 

pulmonary disease) or bronchitis component.  CXR clear.  CT chest clear.  

Stopped doxy (completed five days prior to admit).  Pt says since admit his 

cough has improved (was started on Duonebs QID and solumedrol TID).  Hx of 

occasional smoking perhaps 1/4 ppd at times.  


- Stopped Duonebs QID scheduled.  Kept Duonebs q2h prn for SOB or wheezing.


[ ] Likely transition to PO steroid burst on d/c.  Also consider albuterol mdi 

prn for home as well.  


- Kept new (while inpt) Solumedrol 40 mg IV TID for now. 


- Recommend outpt PFTs upon eventual d/c.  





Hypokalemia / Hypomagnesemia: K 2.2 and Mg 1.3 on admit.  Pt has hx of mag 

around 1.5 at home.  Pt c/o pain with IV KCl repletion, switched to PO.  Both 

presently within normal ranges.  


- Given KCl 20 mEq and Mag 2 grams on 09Sep.


- KCl 20 mEq IV q2h x 3 doses.  Is off KCl IVF on 11Sep.  Will continue to 

monitor.  


- Started magnesium 400 mg PO BID.  Will continue to monitor. 





ESRD secondary to alport syndrome, s/p renal txp x3: Baseline Cr around 1.  On 

admit Cr 1.1.  


- On Mycophenolate Sodium (Myfortic), 360 MG PO BID


- On Tacrolimus (Prograf), 0.5 MG PO QPM


- On Tacrolimus (Prograf), 1 MG PO QAM





PMH HTN: 


- Held Metoprolol Tartrate (Lopressor) 100 MG PO BID.  On metoprolol IV as 

above.  


- Started hydralazine 10 mg PO TID.  


- On Clonidine HCl (Clonidine HCl) 0.2 MG PO BID


- On Amlodipine (Norvasc) 5 MG PO DAILY


- Held amiloride and metolazone.  


- Bumex 0.5 mg PO q day prn for fluid overload.  





PMH Hypothyroidism: 


- Started synthroid 12.5 mcg IV q day.  





PMH Depression: 


- On Sertraline (Zoloft), 200 MG PO DAILY





PMH Gout: 


- Held Allopurinol (Allopurinol), 300 MG PO DAILY





DVT prophy: Holding rx due to bleeding.  SCD's ordered for while in bed, but pt 

prefers to stand when possible. 


Code status: Full resuscitation.  





Will discuss all the above on attending rounds this morning.  RUBEN, PGY1 Family 

Medicine





Resident Tracking


Resident Involvement:  Resident Care Provided


Care Provided:  Adult Hospital Medicine (inpt rounds)





History


Resident Physician Supervision Note:





I was present with Dr. Chan during the history and exam. I discussed the 

case with the resident and agree with the findings and plan as documented in 

the note.  Any exceptions or clarifications are listed here.





Pt reports no further chest discomfort, shortness of breath, melanotic stools, 

abdominal pain or fatigue. He is breathing at baseline, though still has a mild 

wheeze on deep inspiration per him.


General Appearance:  WD/WN, no apparent distress


Respiratory:  chest non-tender, normal breath sounds, no respiratory distress, 

wheezing (mild end exp wheeze)


Cardiovascular:  normal peripheral pulses, regular rate, rhythm, no edema, no 

murmur


Gastrointestinal:  normal bowel sounds, non tender, soft, no organomegaly


Assessment/Plan


53 y/o male h/o ESRD, Alport syndrome (s/p renal xplant x 3), HTN, 

hypothyroidism, depression for GIB





Anemia 2/2 GIB s/p 3U PRBC - trend H/H in AM (more frequently if sx warrant), 

continue PPI, full liquids, colonoscopy today - dispo pending results, likely d/

c


COPD exacerbation - continue prednisolone w/ transition to complete PO burst on 

discharge, albuterol/ipratropium, outpatient PFTs


Demand ischemia - cardiology aware, input appreciated - improved s/p transfusion

, nitro, B blocker - no recurrence


Electrolyte disturbances - replete K/Mg previously - replete K+ PO if needed, 

monitor daily


ESRD secondary to Alport syndrome, s/p renal transplant x 3 - trend Cr daily, 

continue present regimen


HTN - continue lopressor, hydralazine, clonidine, amlodipine, bumex PRN


Hypothyroidism - continue Cytomel


Depression - continue sertraline


Gout - holding allopurinol

## 2017-09-13 NOTE — DISCHARGE SUMMARY
Discharge Summary


Date of Service


Sep 13, 2017.


 (Leander Chan M.D.)





Discharge Summary


Admission Date:


Sep 9, 2017 at 14:51


Discharge Date:  Sep 13, 2017


Discharge Disposition:  Home


Principal Diagnosis:  GI bleed


Problems/Secondary Diagnoses:


- Chest tightness


- Shortness of breath & cough


- Hypokalemia


- Hypomagnesemia


Procedures:


[id number] Upper GI endoscopy


- Normal upper third of esophagus and middle third esophagus.


- Esophageal mucosal changes consistent with short-segment Alonzo's esophagus. 

Biopsied.


- Erythematous mucosa in the gastric body and antrum.


- Non-bleeding erosive gastropathy. Biopsied.


- Normal examined duodenum.


- Non-bleeding duodenal diverticulum.


---Recommendation:


- [facility] patient to [facility] for [facility] care.


- Source of melena and anemia not fully explained by EGD findings today. Last 

colonoscopy appears to be 2010 in [facility] however unclear if performed elsewhere. 

Consider colonoscopy if not recently performed. If bleeding returns consider 

tagged red cell scan.





[id number] Colonoscopy


- Diverticulosis in the sigmoid colon.


- The examination was otherwise normal.


- Non-bleeding internal hemorrhoids.


- No specimens collected.


---Recommendation:       


- Return patient to [facility] for ongoing care.


- Advance diet as tolerated.


- Continue present medications.


- Consider outpt SB capsule to exclude bleeding source. tagged cell scan if 

bleeding returns.





[id number] CT chest without contast


FINDINGS: The lungs are clear. The mediastinal vascular structures are within


normal limits. No mediastinal or hilar lymphadenopathy. No pleural effusion or


pneumothorax. Limited views of the upper abdomen demonstrate a normal liver and


spleen. Kidneys are heavily calcified.  IMPRESSION:  No acute process. 





[id number] Transthoracic echocardiogram


* There is mild concentric left ventricular hypertrophy.


* Left ventricular systolic function is normal.


* Grade I diastolic dysfunction, (abnormal relaxation pattern).


* The left atrium is mildly dilated.


* Mild aortic regurgitation.


* Mild valvular aortic stenosis.


* There is mild mitral regurgitation.


* Right ventricular systolic pressure is elevated at 30-40mmHg.


Consultations:


Gastroenterology, Cardiology


 (Leander Chan M.D.)





Medication Reconciliation


New Medications:  


Albuterol Hfa (Ventolin Hfa) 200 Puffs/31318 Mcg Aers


2-4 PUFFS INH Q6H for Shortness of Breath, #1 INHALER





Prednisone (Prednisone) 50 Mg Tab


50 MG PO DAILY for 2 Days, #2 TAB





 


Continued Medications:  


Allopurinol (Allopurinol) 300 Mg Tab


300 MG PO DAILY





Amiloride Hcl (Amiloride Hcl) 5 Mg Tab


5 MG PO DAILY





Amlodipine (Norvasc) 5 Mg Tab


5 MG PO DAILY





Clonidine HCl (Clonidine HCl) 0.2 Mg Tab


0.2 MG PO BID





Guaifenesin Ext Rel (Mucinex Ext Rel) 600 Mg Tab


1200 MG PO Q12, TAB





Hydralazine Hcl (Apresoline) 10 Mg Tab


10 MG PO TID





Liothyronine Sodium (Liothyronine Sodium) 25 Mcg Tab


25 MCG PO DAILY





Magnesium Oxide (Mg Supplement (Magnesium) 500 Mg Tab


500 MG PO BID





Metolazone (Zaroxolyn) 2.5 Mg Tab


2.5 MG PO DAILY





Metoprolol Tartrate (Lopressor) (Lopressor) 100 Mg Tab


100 MG PO BID





Multivitamin (Multivitamin)  Tab


1 TAB PO DAILY, TAB





Mycophenolate Sodium (Myfortic) 360 Mg Tab


360 MG PO BID





Sertraline (Zoloft) 100 Mg Tab


200 MG PO DAILY





Tacrolimus (Prograf) 0.5 Mg Cap


0.5 MG PO QPM





Tacrolimus (Prograf) 1 Mg Cap


1 MG PO QAM





 


Discontinued Medications:  


Aspirin (Aspirin Ec) 81 Mg Tab


81 MG PO DAILY





Doxycycline (Monohydrate) (Doxycycline) 100 Mg Cap


100 MG PO BID





Fish Oil (Omega-3) 1 Ea Cap


1 CAP PO BID, CAP











Discharge Exam


Review of Systems:  


   Constitutional:  No fever, No chills


   Respiratory:  No cough, No shortness of breath, No dyspnea at rest


   Cardiovascular:  No chest pain, No edema


   Abdomen:  No nausea, No vomiting, No diarrhea


Physical Exam:  


   General Appearance:  WD/WN, no apparent distress


   Respiratory/Chest:  lungs clear, + wheezing (expiratory throughout)


   Cardiovascular:  regular rate, rhythm, no edema, + systolic murmur


   Abdomen / GI:  normal bowel sounds, non tender, soft


   Extremities:  no pedal edema


 (Leander Chan M.D.)





Hospital Course


51 y/o male with a history of ESRD secondary to Alport syndrome s/p renal 

transplant x 3, HTN, hypothyroidism, depression and gout who presented/admitted 

via the ED on 09Sep with shortness of breath, non-productive cough, dizziness 

and melena.  Patient afebrile, VSS on arrival.  CXR negative for acute disease.

  WBC elevated 13.17.  Hgb 7.7.  Potassium 2.2.  Magnesium 1.3.  Cardiac 

enzymes negative.  EKG no ischemic changes.  





Acute blood loss / anemia / GI bleed: 09Sep Hb 7.7.  Transfused one unit PRBC 

on 09Sep, two additional units PRBC on 10Sep.  Iron and TIBC within normal 

limits.  Tolerating PO.  Hb trending upwards to 9.8 on morning of d/c.  No 

reports of recurrence of melena.  GI consulted 09Sep.  EGD performed on 12Sep 

which noted no specific source of melena or anemia.  Colonoscopy also did not 

show a clear source.  GI recommended consider outpt SB capsule study (or tagged 

cell scan if bleeding returns).  Pt was on protonix throughout inpt stay.  

Recommended holding aspirin and fish oil until f/u.  





Chest tightness / unstable angina: Noted on 10Sep, sudden onset.  B-blocker 

given x 3, sx near-resolved.  NTG sublingual x1, sx totally resolved.  Case 

discussed [facility] cardiology.  Believe event was due to supply-demand mismatch 

because of anemia, now corrected.  S/p this event no recurrence of CP.  NSR 

with occasional PVCs and rare bigeminy on the monitor since event.  Cardiac 

echo on 11Sep noted EF >70%, normal LVSF, and elevated RVSP at 30-40.  Pt was 

on metoprolol IV as inpt due to multiple times of NPO for GI workup. 





SOB / Cough: Hx about a week prior to admit.  ? COPD (no known hx of prior 

pulmonary disease) or bronchitis component.  CXR clear.  CT chest clear.  

Stopped doxy (completed five days prior to admit).  Pt says since admit his 

cough has improved (was started on Duonebs QID and solumedrol TID).  Hx of 

occasional smoking perhaps 1/4 ppd at times.  Transitioned to duonebs prn, but 

pt didn't actually request any.  Does have some expiratory wheezing ongoing but 

no return of SOB and cough virtually resolved.  Transitioned steroids to PO for 

completion of burst coverage for ~5 days and rx'ed albuterol mdi prn for home.  

Discussed all the above with pt, rec'd outpt pulm workup (likely PFTs) through 

PCM.  





Hypokalemia / Hypomagnesemia: K 2.2 and Mg 1.3 on admit.  Pt has hx of mag 

around 1.5 at home.  Pt c/o pain with IV KCl repletion, switched to PO.  Also 

gave mag as supplement.  Both presently within normal ranges at d/c.  





ESRD secondary to alport syndrome, s/p renal txp x3: Baseline Cr around 1.  On 

admit Cr 1.1, on d/c 0.79.  Pt remained on his home doses of myfortic and 

prograf.  





PMH HTN: Was placed on metoprolol IV for his chest sx as noted above.  

Otherwise can transition back to his prior home HTN rx regimen and f/u with his 

PCM for ongoing care.  





PMH Hypothyroidism: No known acute issues.  





PMH Depression: No known acute issues.  





PMH Gout: No known acute issues.  Did hold his allopurinol during hospital stay.


Total Time Spent:  Greater than 30 minutes


This includes examination of the patient, discharge planning, medication 

reconciliation, and communication with other providers.


 (Leander Chan M.D.)





Discharge Instructions


Please refer to the electronic Patient Visit Report (Discharge Instructions) 

for additional information.


 (Leander Chan M.D.)





Follow-Up


Follow up with Dr. Monahan.  Recommend recheck of electrolytes (including 

magnesium) and H/H on that Friday 15Sep visit. 


 (Leander Chan M.D.)





Additional Copies To


Gray Monahan M.D.





History


Resident Physician Supervision Note:





I was present with Dr. Chan during the history and exam. I discussed the 

case with the resident and agree with the findings and plan as documented in 

the note.  Any exceptions or clarifications are listed here.





For full attending documentation, please see progress note from day of 

discharge.


 (Gerson Urbano MD)


Assessment/Plan


51 y/o male h/o ESRD, Alport syndrome (s/p renal xplant x 3), HTN, 

hypothyroidism, depression for GIB





Anemia 2/2 GIB s/p 3U PRBC - H/H stable, colonoscopy and EGD complete w/o 

source ID'd - follow w/ GI as outpatient for capsule endoscopy


COPD exacerbation - complete steroid burst, albuterol/ipratropium, outpatient 

PFTs


Demand ischemia - cardiology aware, input appreciated - improved s/p transfusion

, nitro, B blocker - no recurrence


Electrolyte disturbances - replete K/Mg previously - would recheck as outpatient


ESRD secondary to Alport syndrome, s/p renal transplant x 3 - stable


HTN - continue lopressor, hydralazine, clonidine, amlodipine, bumex PRN


Hypothyroidism - continue Cytomel


Depression - continue sertraline


Gout - holding allopurinol


 (Gerson Urbano MD)

## 2017-09-13 NOTE — ANESTHESIOLOGY PROGRESS NOTE
Anesthesia Post Op Note


Date & Time


Sep 13, 2017 at 15:37





Vital Signs


Pain Intensity:  0.0





Vital Signs Past 12 Hours








  Date Time  Temp Pulse Resp B/P (MAP) Pulse Ox O2 Delivery O2 Flow Rate FiO2


 


9/13/17 15:21  62 20 115/75 (88) 98 Room Air  


 


9/13/17 14:09 36.6 64 20 138/81 (100) 98 Room Air  


 


9/13/17 12:35 36.8 66 18 127/69 98 Room Air  


 


9/13/17 12:17  66  127/69    


 


9/13/17 12:00      Room Air  


 


9/13/17 11:44 36.8 66 18 127/69 (88) 98   


 


9/13/17 11:32      Room Air  


 


9/13/17 09:09 37.0 88 18 131/63 96 Room Air  


 


9/13/17 08:42  88  131/63    


 


9/13/17 07:46 37.0 88 18 131/63 (85) 96   


 


9/13/17 04:48  67  144/78    


 


9/13/17 04:42      Room Air  


 


9/13/17 04:05 36.4 67 17 144/78 (100) 97 Room Air  











Notes


Mental Status:  alert / awake / arousable, participated in evaluation


Pt Amnestic to Procedure:  Yes


Nausea / Vomiting:  adequately controlled


Pain:  adequately controlled


Airway Patency, RR, SpO2:  stable & adequate


BP & HR:  stable & adequate


Hydration State:  stable & adequate


Anesthetic Complications:  no major complications apparent

## 2017-09-19 ENCOUNTER — HOSPITAL ENCOUNTER (OUTPATIENT)
Dept: HOSPITAL 45 - C.LAB1850 | Age: 52
Discharge: HOME | End: 2017-09-19
Attending: PHYSICIAN ASSISTANT
Payer: COMMERCIAL

## 2017-09-19 DIAGNOSIS — K92.1: Primary | ICD-10-CM

## 2017-09-19 DIAGNOSIS — Z94.0: ICD-10-CM

## 2017-09-19 DIAGNOSIS — K92.2: ICD-10-CM

## 2017-09-19 DIAGNOSIS — Z51.81: ICD-10-CM

## 2017-09-19 DIAGNOSIS — Z79.899: ICD-10-CM

## 2017-09-19 LAB
FERRITIN SERPL-MCNC: 19.3 NG/ML (ref 8–388)
TIBC SERPL-MCNC: 349 MCG/DL (ref 250–450)

## 2017-10-04 ENCOUNTER — HOSPITAL ENCOUNTER (OUTPATIENT)
Dept: HOSPITAL 45 - C.LAB1850 | Age: 52
End: 2017-10-04
Attending: INTERNAL MEDICINE
Payer: COMMERCIAL

## 2017-10-04 DIAGNOSIS — I10: Primary | ICD-10-CM

## 2017-10-04 LAB — HCT VFR BLD CALC: 32.2 % (ref 42–52)

## 2018-04-20 ENCOUNTER — HOSPITAL ENCOUNTER (OUTPATIENT)
Dept: HOSPITAL 45 - C.LAB1850 | Age: 53
Discharge: HOME | End: 2018-04-20
Attending: INTERNAL MEDICINE
Payer: COMMERCIAL

## 2018-04-20 DIAGNOSIS — J06.9: Primary | ICD-10-CM

## 2018-04-20 LAB
CREAT UR-MCNC: 70.7 MG/DL
HBA1C MFR BLD: 5.2 % (ref 4.5–5.6)
TRANSFERRIN SERPL-MCNC: 281 MG/DL (ref 200–360)
URATE SERPL-MCNC: 5.8 MG/DL (ref 2.6–7.2)